# Patient Record
Sex: FEMALE | Race: WHITE | Employment: UNEMPLOYED | URBAN - METROPOLITAN AREA
[De-identification: names, ages, dates, MRNs, and addresses within clinical notes are randomized per-mention and may not be internally consistent; named-entity substitution may affect disease eponyms.]

---

## 2017-01-18 ENCOUNTER — HOSPITAL ENCOUNTER (OUTPATIENT)
Dept: ULTRASOUND IMAGING | Age: 42
Discharge: HOME | End: 2017-01-18
Attending: FAMILY MEDICINE
Payer: COMMERCIAL

## 2017-01-18 ENCOUNTER — PATIENT MESSAGE (OUTPATIENT)
Dept: FAMILY MEDICINE CLINIC | Facility: CLINIC | Age: 42
End: 2017-01-18

## 2017-01-18 ENCOUNTER — HOSPITAL ENCOUNTER (OUTPATIENT)
Dept: MAMMOGRAPHY | Age: 42
Discharge: HOME | End: 2017-01-18
Attending: FAMILY MEDICINE
Payer: COMMERCIAL

## 2017-01-18 DIAGNOSIS — E04.9 GOITER: ICD-10-CM

## 2017-01-18 DIAGNOSIS — Z12.31 ENCOUNTER FOR SCREENING MAMMOGRAM FOR HIGH-RISK PATIENT: ICD-10-CM

## 2017-01-18 PROCEDURE — 77067 SCR MAMMO BI INCL CAD: CPT

## 2017-01-18 PROCEDURE — 76536 US EXAM OF HEAD AND NECK: CPT

## 2017-01-18 PROCEDURE — 77063 BREAST TOMOSYNTHESIS BI: CPT

## 2017-01-19 NOTE — TELEPHONE ENCOUNTER
From: Rhona Calico Rentmeester  To: Dean Sloan DO  Sent: 1/18/2017 10:10 PM CST  Subject: Test Results Question    I received my recent test results for my ultrasound on my thyroid, I'm confused to why I need to come back in 1-2 months, oanh Sandoval

## 2018-02-12 ENCOUNTER — TELEPHONE (OUTPATIENT)
Dept: FAMILY MEDICINE CLINIC | Facility: CLINIC | Age: 43
End: 2018-02-12

## 2018-02-12 RX ORDER — SULFAMETHOXAZOLE AND TRIMETHOPRIM 800; 160 MG/1; MG/1
1 TABLET ORAL 2 TIMES DAILY
Qty: 14 TABLET | Refills: 0 | Status: SHIPPED | OUTPATIENT
Start: 2018-02-12 | End: 2018-03-14

## 2018-02-12 NOTE — TELEPHONE ENCOUNTER
Per Dr. Rebeca Cheadle, pt called last night with UTI symptoms.   Per Dr. Hernandes Reasons Bactrim DS 1 BID x 7 days

## 2018-03-14 ENCOUNTER — OFFICE VISIT (OUTPATIENT)
Dept: FAMILY MEDICINE CLINIC | Facility: CLINIC | Age: 43
End: 2018-03-14

## 2018-03-14 VITALS
RESPIRATION RATE: 18 BRPM | HEART RATE: 66 BPM | DIASTOLIC BLOOD PRESSURE: 76 MMHG | BODY MASS INDEX: 24.63 KG/M2 | OXYGEN SATURATION: 98 % | SYSTOLIC BLOOD PRESSURE: 114 MMHG | TEMPERATURE: 98 F | WEIGHT: 139 LBS | HEIGHT: 63 IN

## 2018-03-14 DIAGNOSIS — Z00.00 ANNUAL PHYSICAL EXAM: ICD-10-CM

## 2018-03-14 DIAGNOSIS — E04.9 GOITER: ICD-10-CM

## 2018-03-14 DIAGNOSIS — Z01.419 WELL WOMAN EXAM WITH ROUTINE GYNECOLOGICAL EXAM: Primary | ICD-10-CM

## 2018-03-14 DIAGNOSIS — Z12.31 ENCOUNTER FOR SCREENING MAMMOGRAM FOR HIGH-RISK PATIENT: ICD-10-CM

## 2018-03-14 PROCEDURE — 88175 CYTOPATH C/V AUTO FLUID REDO: CPT | Performed by: FAMILY MEDICINE

## 2018-03-14 PROCEDURE — 87624 HPV HI-RISK TYP POOLED RSLT: CPT | Performed by: FAMILY MEDICINE

## 2018-03-14 PROCEDURE — 99396 PREV VISIT EST AGE 40-64: CPT | Performed by: FAMILY MEDICINE

## 2018-03-14 RX ORDER — MULTIVIT-MIN/IRON FUM/FOLIC AC 7.5 MG-4
1 TABLET ORAL DAILY
COMMUNITY

## 2018-03-14 RX ORDER — SULFAMETHOXAZOLE AND TRIMETHOPRIM 800; 160 MG/1; MG/1
1 TABLET ORAL 2 TIMES DAILY
Qty: 14 TABLET | Refills: 0 | Status: SHIPPED | OUTPATIENT
Start: 2018-03-14 | End: 2019-06-20

## 2018-03-14 NOTE — PROGRESS NOTES
HPI:   Catherine Dhillon is a 36year old female who presents for a complete physical exam.       Wt Readings from Last 6 Encounters:  03/14/18 : 139 lb  09/13/16 : 126 lb  08/17/15 : 124 lb  08/15/14 : 117 lb  02/26/13 : 110 lb  12/27/12 : 108 lb unusual skin lesions  EYES:denies blurred vision or double vision  HEENT: denies nasal congestion, sinus pain or ST  LUNGS: denies shortness of breath with exertion  CARDIOVASCULAR: denies chest pain on exertion  GI: denies abdominal pain,denies heartburn Future  - COMP METABOLIC PANEL (14); Future  - VITAMIN D, 25-HYDROXY; Future  - THYROID PEROXIDASE AND THYROGLOBULIN ANTIBODIES; Future    3. Goiter    - US THYROID (DZF=12752); Future    4.  Encounter for screening mammogram for high-risk patient    - MONTSE

## 2018-03-15 LAB — HPV I/H RISK 1 DNA SPEC QL NAA+PROBE: NEGATIVE

## 2018-03-19 ENCOUNTER — HOSPITAL ENCOUNTER (OUTPATIENT)
Dept: ULTRASOUND IMAGING | Age: 43
Discharge: HOME | End: 2018-03-19
Attending: FAMILY MEDICINE
Payer: COMMERCIAL

## 2018-03-19 ENCOUNTER — LABORATORY ENCOUNTER (OUTPATIENT)
Dept: LAB | Age: 43
End: 2018-03-19
Attending: FAMILY MEDICINE
Payer: COMMERCIAL

## 2018-03-19 ENCOUNTER — HOSPITAL ENCOUNTER (OUTPATIENT)
Dept: MAMMOGRAPHY | Age: 43
Discharge: HOME | End: 2018-03-19
Attending: FAMILY MEDICINE
Payer: COMMERCIAL

## 2018-03-19 DIAGNOSIS — E04.9 GOITER: ICD-10-CM

## 2018-03-19 DIAGNOSIS — Z00.00 ANNUAL PHYSICAL EXAM: ICD-10-CM

## 2018-03-19 DIAGNOSIS — Z12.31 ENCOUNTER FOR SCREENING MAMMOGRAM FOR HIGH-RISK PATIENT: ICD-10-CM

## 2018-03-19 LAB
25-HYDROXYVITAMIN D (TOTAL): 67 NG/ML (ref 30–100)
ALBUMIN SERPL-MCNC: 3.8 G/DL (ref 3.5–4.8)
ALP LIVER SERPL-CCNC: 45 U/L (ref 37–98)
ALT SERPL-CCNC: 16 U/L (ref 14–54)
ANTI-THYROGLOBULIN: <15 U/ML (ref ?–60)
ANTI-THYROPEROXIDASE: <28 U/ML (ref ?–60)
AST SERPL-CCNC: 11 U/L (ref 15–41)
BASOPHILS # BLD AUTO: 0.05 X10(3) UL (ref 0–0.1)
BASOPHILS NFR BLD AUTO: 1.2 %
BILIRUB SERPL-MCNC: 0.5 MG/DL (ref 0.1–2)
BUN BLD-MCNC: 14 MG/DL (ref 8–20)
CALCIUM BLD-MCNC: 8.4 MG/DL (ref 8.3–10.3)
CHLORIDE: 106 MMOL/L (ref 101–111)
CHOLEST SMN-MCNC: 173 MG/DL (ref ?–200)
CO2: 26 MMOL/L (ref 22–32)
CREAT BLD-MCNC: 0.84 MG/DL (ref 0.55–1.02)
EOSINOPHIL # BLD AUTO: 0 X10(3) UL (ref 0–0.3)
EOSINOPHIL NFR BLD AUTO: 0 %
ERYTHROCYTE [DISTWIDTH] IN BLOOD BY AUTOMATED COUNT: 12.5 % (ref 11.5–16)
FREE T4: 0.6 NG/DL (ref 0.9–1.8)
GLUCOSE BLD-MCNC: 88 MG/DL (ref 70–99)
HAV AB SERPL IA-ACNC: 516 PG/ML (ref 193–986)
HCT VFR BLD AUTO: 37.4 % (ref 34–50)
HDLC SERPL-MCNC: 101 MG/DL (ref 45–?)
HDLC SERPL: 1.71 {RATIO} (ref ?–4.44)
HGB BLD-MCNC: 12.3 G/DL (ref 12–16)
IMMATURE GRANULOCYTE COUNT: 0.01 X10(3) UL (ref 0–1)
IMMATURE GRANULOCYTE RATIO %: 0.2 %
LDLC SERPL CALC-MCNC: 57 MG/DL (ref ?–130)
LYMPHOCYTES # BLD AUTO: 1.79 X10(3) UL (ref 0.9–4)
LYMPHOCYTES NFR BLD AUTO: 44.2 %
M PROTEIN MFR SERPL ELPH: 6.7 G/DL (ref 6.1–8.3)
MCH RBC QN AUTO: 31.9 PG (ref 27–33.2)
MCHC RBC AUTO-ENTMCNC: 32.9 G/DL (ref 31–37)
MCV RBC AUTO: 96.9 FL (ref 81–100)
MONOCYTES # BLD AUTO: 0.34 X10(3) UL (ref 0.1–1)
MONOCYTES NFR BLD AUTO: 8.4 %
NEUTROPHIL ABS PRELIM: 1.86 X10 (3) UL (ref 1.3–6.7)
NEUTROPHILS # BLD AUTO: 1.86 X10(3) UL (ref 1.3–6.7)
NEUTROPHILS NFR BLD AUTO: 46 %
NONHDLC SERPL-MCNC: 72 MG/DL (ref ?–130)
PLATELET # BLD AUTO: 199 10(3)UL (ref 150–450)
POTASSIUM SERPL-SCNC: 3.8 MMOL/L (ref 3.6–5.1)
RBC # BLD AUTO: 3.86 X10(6)UL (ref 3.8–5.1)
RED CELL DISTRIBUTION WIDTH-SD: 44.1 FL (ref 35.1–46.3)
SODIUM SERPL-SCNC: 139 MMOL/L (ref 136–144)
T3FREE SERPL-MCNC: 2.2 PG/ML (ref 2.3–4.2)
TRIGL SERPL-MCNC: 77 MG/DL (ref ?–150)
TSI SER-ACNC: 3.94 MIU/ML (ref 0.35–5.5)
VLDLC SERPL CALC-MCNC: 15 MG/DL (ref 5–40)
WBC # BLD AUTO: 4.1 X10(3) UL (ref 4–13)

## 2018-03-19 PROCEDURE — 82607 VITAMIN B-12: CPT

## 2018-03-19 PROCEDURE — 36415 COLL VENOUS BLD VENIPUNCTURE: CPT

## 2018-03-19 PROCEDURE — 86376 MICROSOMAL ANTIBODY EACH: CPT

## 2018-03-19 PROCEDURE — 76536 US EXAM OF HEAD AND NECK: CPT | Performed by: FAMILY MEDICINE

## 2018-03-19 PROCEDURE — 80053 COMPREHEN METABOLIC PANEL: CPT

## 2018-03-19 PROCEDURE — 77067 SCR MAMMO BI INCL CAD: CPT | Performed by: FAMILY MEDICINE

## 2018-03-19 PROCEDURE — 84443 ASSAY THYROID STIM HORMONE: CPT

## 2018-03-19 PROCEDURE — 80050 GENERAL HEALTH PANEL: CPT

## 2018-03-19 PROCEDURE — 84439 ASSAY OF FREE THYROXINE: CPT

## 2018-03-19 PROCEDURE — 86800 THYROGLOBULIN ANTIBODY: CPT

## 2018-03-19 PROCEDURE — 84481 FREE ASSAY (FT-3): CPT

## 2018-03-19 PROCEDURE — 77063 BREAST TOMOSYNTHESIS BI: CPT | Performed by: FAMILY MEDICINE

## 2018-03-19 PROCEDURE — 80061 LIPID PANEL: CPT

## 2018-03-19 PROCEDURE — 82306 VITAMIN D 25 HYDROXY: CPT

## 2018-08-03 ENCOUNTER — TELEPHONE (OUTPATIENT)
Dept: FAMILY MEDICINE CLINIC | Facility: CLINIC | Age: 43
End: 2018-08-03

## 2018-08-03 DIAGNOSIS — E04.9 GOITER: Primary | ICD-10-CM

## 2018-08-03 NOTE — TELEPHONE ENCOUNTER
Needs thyroid labs put in. She is to redo them in 3-6 months from last draw. Please call her when they are put in.

## 2018-08-06 ENCOUNTER — APPOINTMENT (OUTPATIENT)
Dept: LAB | Age: 43
End: 2018-08-06
Attending: FAMILY MEDICINE
Payer: COMMERCIAL

## 2018-08-06 DIAGNOSIS — E04.9 GOITER: ICD-10-CM

## 2018-08-06 LAB
T3FREE SERPL-MCNC: 2.32 PG/ML (ref 2.3–4.2)
T4 FREE SERPL-MCNC: 0.8 NG/DL (ref 0.9–1.8)
THYROPEROXIDASE AB SERPL-ACNC: <28 U/ML (ref ?–60)
TSI SER-ACNC: 2.28 MIU/ML (ref 0.35–5.5)

## 2018-08-06 PROCEDURE — 84481 FREE ASSAY (FT-3): CPT

## 2018-08-06 PROCEDURE — 84443 ASSAY THYROID STIM HORMONE: CPT

## 2018-08-06 PROCEDURE — 36415 COLL VENOUS BLD VENIPUNCTURE: CPT

## 2018-08-06 PROCEDURE — 84439 ASSAY OF FREE THYROXINE: CPT

## 2018-08-06 PROCEDURE — 86376 MICROSOMAL ANTIBODY EACH: CPT

## 2018-08-29 ENCOUNTER — LAB ENCOUNTER (OUTPATIENT)
Dept: LAB | Age: 43
End: 2018-08-29
Attending: FAMILY MEDICINE
Payer: COMMERCIAL

## 2018-08-29 ENCOUNTER — OFFICE VISIT (OUTPATIENT)
Dept: FAMILY MEDICINE CLINIC | Facility: CLINIC | Age: 43
End: 2018-08-29
Payer: COMMERCIAL

## 2018-08-29 VITALS
BODY MASS INDEX: 24.67 KG/M2 | SYSTOLIC BLOOD PRESSURE: 98 MMHG | WEIGHT: 141 LBS | HEART RATE: 60 BPM | OXYGEN SATURATION: 99 % | DIASTOLIC BLOOD PRESSURE: 62 MMHG | RESPIRATION RATE: 16 BRPM | TEMPERATURE: 98 F | HEIGHT: 63.5 IN

## 2018-08-29 DIAGNOSIS — R00.2 PALPITATIONS: ICD-10-CM

## 2018-08-29 DIAGNOSIS — N92.0 MENORRHAGIA WITH REGULAR CYCLE: ICD-10-CM

## 2018-08-29 DIAGNOSIS — G47.9 SLEEP DISTURBANCE: Primary | ICD-10-CM

## 2018-08-29 DIAGNOSIS — G47.09 OTHER INSOMNIA: ICD-10-CM

## 2018-08-29 LAB
BASOPHILS # BLD AUTO: 0.05 X10(3) UL (ref 0–0.1)
BASOPHILS NFR BLD AUTO: 0.9 %
DEPRECATED HBV CORE AB SER IA-ACNC: 63.9 NG/ML (ref 12–240)
EOSINOPHIL # BLD AUTO: 0 X10(3) UL (ref 0–0.3)
EOSINOPHIL NFR BLD AUTO: 0 %
ERYTHROCYTE [DISTWIDTH] IN BLOOD BY AUTOMATED COUNT: 12.1 % (ref 11.5–16)
HCT VFR BLD AUTO: 35.4 % (ref 34–50)
HGB BLD-MCNC: 11.8 G/DL (ref 12–16)
IMMATURE GRANULOCYTE COUNT: 0.01 X10(3) UL (ref 0–1)
IMMATURE GRANULOCYTE RATIO %: 0.2 %
IRON SATURATION: 25 % (ref 15–50)
IRON: 90 UG/DL (ref 28–170)
LYMPHOCYTES # BLD AUTO: 1.54 X10(3) UL (ref 0.9–4)
LYMPHOCYTES NFR BLD AUTO: 27.2 %
MCH RBC QN AUTO: 32.8 PG (ref 27–33.2)
MCHC RBC AUTO-ENTMCNC: 33.3 G/DL (ref 31–37)
MCV RBC AUTO: 98.3 FL (ref 81–100)
MONOCYTES # BLD AUTO: 0.35 X10(3) UL (ref 0.1–1)
MONOCYTES NFR BLD AUTO: 6.2 %
NEUTROPHIL ABS PRELIM: 3.72 X10 (3) UL (ref 1.3–6.7)
NEUTROPHILS # BLD AUTO: 3.72 X10(3) UL (ref 1.3–6.7)
NEUTROPHILS NFR BLD AUTO: 65.5 %
PLATELET # BLD AUTO: 217 10(3)UL (ref 150–450)
RBC # BLD AUTO: 3.6 X10(6)UL (ref 3.8–5.1)
RED CELL DISTRIBUTION WIDTH-SD: 44 FL (ref 35.1–46.3)
TOTAL IRON BINDING CAPACITY: 355 UG/DL (ref 240–450)
TRANSFERRIN SERPL-MCNC: 238 MG/DL (ref 200–360)
WBC # BLD AUTO: 5.7 X10(3) UL (ref 4–13)

## 2018-08-29 PROCEDURE — 99214 OFFICE O/P EST MOD 30 MIN: CPT | Performed by: FAMILY MEDICINE

## 2018-08-29 PROCEDURE — 82728 ASSAY OF FERRITIN: CPT

## 2018-08-29 PROCEDURE — 85025 COMPLETE CBC W/AUTO DIFF WBC: CPT

## 2018-08-29 PROCEDURE — 83550 IRON BINDING TEST: CPT

## 2018-08-29 PROCEDURE — 83540 ASSAY OF IRON: CPT

## 2018-08-29 PROCEDURE — 36415 COLL VENOUS BLD VENIPUNCTURE: CPT

## 2018-08-29 NOTE — PROGRESS NOTES
HPI:   Alanna Payne is a 36year old female who presents for sleep concerns    Pt reports she has been having periodic problems  Pt struggle to fall asleep and then will often wake up feeling palpitations  Pt will have heart racing   Pt feels congestion, sinus pain or ST  LUNGS: denies shortness of breath with exertion  CARDIOVASCULAR: denies chest pain on exertion  GI: denies abdominal pain,denies heartburn  : denies dysuria, vaginal discharge or itching,periods regular   MUSCULOSKELETAL: de

## 2018-08-29 NOTE — PROGRESS NOTES
HPI:   Phillip Patel is a 36year old female who presents for sleep concerns        Current Outpatient Prescriptions:  Multiple Vitamins-Minerals (MULTI-VITAMIN/MINERALS) Oral Tab Take 1 tablet by mouth daily.  Disp:  Rfl:    Omega-3 Fatty Acids murmur  LUNGS: clear to auscultation  NECK: supple,no adenopathy, + thyromegaly  HEENT: atraumatic, normocephalic,ears and throat are clear  EYES:PERRLA, EOMI, normal,conjunctiva are clear  SKIN: norashes,no suspicious lesions  GI: good BS's,no masses, HSM

## 2018-08-30 ENCOUNTER — PATIENT MESSAGE (OUTPATIENT)
Dept: FAMILY MEDICINE CLINIC | Facility: CLINIC | Age: 43
End: 2018-08-30

## 2018-08-31 NOTE — TELEPHONE ENCOUNTER
From: Palmira Hugh Rentmeester  To: Jada Gilbert DO  Sent: 8/30/2018 4:20 PM CDT  Subject: Test Results Question    My rbc and hgb are low, anything I need to do or be concerned about?

## 2018-09-11 ENCOUNTER — OFFICE VISIT (OUTPATIENT)
Dept: SLEEP CENTER | Facility: HOSPITAL | Age: 43
End: 2018-09-11
Attending: INTERNAL MEDICINE
Payer: COMMERCIAL

## 2018-09-11 DIAGNOSIS — G47.9 SLEEP DISTURBANCE: ICD-10-CM

## 2018-09-11 DIAGNOSIS — G47.09 OTHER INSOMNIA: ICD-10-CM

## 2018-09-11 PROCEDURE — 95806 SLEEP STUDY UNATT&RESP EFFT: CPT

## 2018-09-13 NOTE — PROCEDURES
[address]       Accredited by the [facility] of Sleep Medicine ([facility])    PATIENT'S NAME:        Leonel Guerrero  ATTENDING PHYSICIAN:   Chris Jacome M.D.   REFERRING PHYSICIAN:   JACOB Smiley resistance. RECOMMENDATIONS:    1. In the absence of sleep-disordered breathing, further clinical correlation is necessary to determine etiology of the patient's ongoing symptoms. 2.   The patient should avoid drowsy driving.   3.   The patient may fo

## 2018-11-13 ENCOUNTER — OFFICE VISIT (OUTPATIENT)
Dept: FAMILY MEDICINE CLINIC | Facility: CLINIC | Age: 43
End: 2018-11-13
Payer: COMMERCIAL

## 2018-11-13 VITALS
HEIGHT: 63.5 IN | RESPIRATION RATE: 20 BRPM | TEMPERATURE: 98 F | OXYGEN SATURATION: 99 % | BODY MASS INDEX: 24.26 KG/M2 | HEART RATE: 62 BPM | WEIGHT: 138.63 LBS | DIASTOLIC BLOOD PRESSURE: 64 MMHG | SYSTOLIC BLOOD PRESSURE: 100 MMHG

## 2018-11-13 DIAGNOSIS — S29.011A PECTORALIS MUSCLE STRAIN, INITIAL ENCOUNTER: ICD-10-CM

## 2018-11-13 DIAGNOSIS — Z80.0 FAMILY HISTORY OF COLON CANCER: Primary | ICD-10-CM

## 2018-11-13 PROCEDURE — 99213 OFFICE O/P EST LOW 20 MIN: CPT | Performed by: FAMILY MEDICINE

## 2018-11-13 NOTE — PROGRESS NOTES
HPI:   Raj Galicia is a 36year old female who presents for left breast pain     It started at the end of October     + family history of  ovarian cancer- maternal aunt   Maternal aunt and paternal aunt and cousin in her 29's - breast cancer   Mate or anxiety  HEMATOLOGIC: denies hx of anemia  ALL/ASTHMA: denies asthma    EXAM:   /64   Pulse 62   Temp 98.2 °F (36.8 °C) (Oral)   Resp 20   Ht 63.5\"   Wt 138 lb 9.6 oz   LMP 10/22/2018   SpO2 99%   BMI 24.17 kg/m²   Body mass index is 24.17 kg/m².

## 2019-01-14 ENCOUNTER — OFFICE VISIT (OUTPATIENT)
Dept: SURGERY | Facility: CLINIC | Age: 44
End: 2019-01-14
Payer: COMMERCIAL

## 2019-01-14 VITALS
WEIGHT: 130 LBS | SYSTOLIC BLOOD PRESSURE: 105 MMHG | BODY MASS INDEX: 22.75 KG/M2 | HEART RATE: 61 BPM | DIASTOLIC BLOOD PRESSURE: 71 MMHG | HEIGHT: 63.5 IN | TEMPERATURE: 98 F

## 2019-01-14 DIAGNOSIS — R19.8 ALTERNATING CONSTIPATION AND DIARRHEA: Primary | ICD-10-CM

## 2019-01-14 DIAGNOSIS — Z80.0 FAMILY HISTORY OF COLON CANCER: ICD-10-CM

## 2019-01-14 DIAGNOSIS — Z83.71 FAMILY HISTORY OF COLONIC POLYPS: ICD-10-CM

## 2019-01-14 DIAGNOSIS — E28.2 POLYCYSTIC OVARIES: ICD-10-CM

## 2019-01-14 PROCEDURE — 99243 OFF/OP CNSLTJ NEW/EST LOW 30: CPT | Performed by: COLON & RECTAL SURGERY

## 2019-01-14 RX ORDER — MELATONIN 10 MG
CAPSULE ORAL AS NEEDED
COMMUNITY

## 2019-01-14 RX ORDER — POLYETHYLENE GLYCOL 3350, SODIUM CHLORIDE, SODIUM BICARBONATE, POTASSIUM CHLORIDE 420; 11.2; 5.72; 1.48 G/4L; G/4L; G/4L; G/4L
POWDER, FOR SOLUTION ORAL
Qty: 1 BOTTLE | Refills: 0 | Status: SHIPPED | OUTPATIENT
Start: 2019-01-14 | End: 2019-06-20 | Stop reason: ALTCHOICE

## 2019-01-14 NOTE — H&P
New Patient Visit Note       Active Problems      1. Alternating constipation and diarrhea    2. Polycystic ovaries    3. Family history of colon cancer, paternal aunt age 62    1.  Family history of colonic polyps, father         Chief Tommy Waddell had a stroke or TIA. The patient does not have a heart murmur or valve. The patient does not require special antibiotics prior to surgery because of a prosthesis or any other reason.      The patient is not on medications that might interfere with IV se No    Other Topics      Concerns:        Caffeine Concern: No        Exercise: Yes       Current Outpatient Medications:  Melatonin 10 MG Oral Cap Take by mouth as needed.  Disp:  Rfl:    PEG 3350-KCl-Na Bicarb-NaCl (TRILYTE) 420 g Oral Recon Soln Starting distress. HENT:   Head: Normocephalic and atraumatic. Eyes: EOM are normal.   Neck: Normal range of motion. Pulmonary/Chest: Effort normal. No respiratory distress. Abdominal: Normal appearance.  She exhibits no shifting dullness, no distension, no was fully discussed. The patient seemed to understand the conversation and its details. Consent for surgery was confirmed with the patient.     In light of her strong family history of breast cancer, I recommend she receive genetic testing and also qu

## 2019-01-14 NOTE — PATIENT INSTRUCTIONS
Emanuel Woodard is a 39year old female her for a colonoscopy. The patient has not had a previous colonoscopy. The patient does not have a history of black/tarry stools. The patient does not have a history of bright red blood per rectum.  The pa negaitve, the patient is not exhibiting rigidity, guarding or rebound. There are no ascites. No palpable ventral or inguinal hernias bilaterally. Liver is within normal limits and spleen is not palpable.     The patient has alternating diarrhea and constipa

## 2019-01-18 PROBLEM — R19.8 ALTERNATING CONSTIPATION AND DIARRHEA: Status: ACTIVE | Noted: 2019-01-18

## 2019-01-18 PROBLEM — Z83.719 FAMILY HISTORY OF COLONIC POLYPS: Status: RESOLVED | Noted: 2019-01-18 | Resolved: 2019-01-18

## 2019-01-18 PROBLEM — Z83.71 FAMILY HISTORY OF COLONIC POLYPS: Status: RESOLVED | Noted: 2019-01-18 | Resolved: 2019-01-18

## 2019-01-18 PROBLEM — Z83.719 FAMILY HISTORY OF COLONIC POLYPS: Status: ACTIVE | Noted: 2019-01-18

## 2019-01-18 PROBLEM — Z83.71 FAMILY HISTORY OF COLONIC POLYPS: Status: ACTIVE | Noted: 2019-01-18

## 2019-01-18 PROBLEM — Z80.0 FAMILY HISTORY OF COLON CANCER: Status: ACTIVE | Noted: 2019-01-18

## 2019-01-23 ENCOUNTER — TELEPHONE (OUTPATIENT)
Dept: SURGERY | Facility: CLINIC | Age: 44
End: 2019-01-23

## 2019-01-23 DIAGNOSIS — R19.4 CHANGE IN BOWEL HABITS: Primary | ICD-10-CM

## 2019-01-23 NOTE — TELEPHONE ENCOUNTER
Phoned pt, left  for pt to call our office to reschedule her surgery with Dr. Kiana Jean-Baptiste for 2/27/19

## 2019-01-24 ENCOUNTER — PATIENT MESSAGE (OUTPATIENT)
Dept: FAMILY MEDICINE CLINIC | Facility: CLINIC | Age: 44
End: 2019-01-24

## 2019-01-24 DIAGNOSIS — Z80.0 FAMILY HISTORY OF MALIGNANT NEOPLASM OF GASTROINTESTINAL TRACT: Primary | ICD-10-CM

## 2019-01-24 NOTE — TELEPHONE ENCOUNTER
From: Natalya Bazan  To: Milagros De Dios DO  Sent: 1/24/2019 12:31 PM CST  Subject: Referral Request    Hi Dr. Edwardo Patel,    I scheduled my colonoscopy with Dr. Renford Barthel, they  called me and said he had to cancel until further notice?  Anyway,  Who else

## 2019-05-03 ENCOUNTER — LAB ENCOUNTER (OUTPATIENT)
Dept: LAB | Age: 44
End: 2019-05-03
Attending: FAMILY MEDICINE
Payer: COMMERCIAL

## 2019-05-03 ENCOUNTER — OFFICE VISIT (OUTPATIENT)
Dept: FAMILY MEDICINE CLINIC | Facility: CLINIC | Age: 44
End: 2019-05-03
Payer: COMMERCIAL

## 2019-05-03 VITALS
SYSTOLIC BLOOD PRESSURE: 108 MMHG | HEIGHT: 63.5 IN | DIASTOLIC BLOOD PRESSURE: 68 MMHG | BODY MASS INDEX: 25.37 KG/M2 | TEMPERATURE: 98 F | RESPIRATION RATE: 16 BRPM | OXYGEN SATURATION: 98 % | HEART RATE: 70 BPM | WEIGHT: 145 LBS

## 2019-05-03 DIAGNOSIS — Z00.00 ANNUAL PHYSICAL EXAM: ICD-10-CM

## 2019-05-03 DIAGNOSIS — Z01.419 WELL WOMAN EXAM WITH ROUTINE GYNECOLOGICAL EXAM: Primary | ICD-10-CM

## 2019-05-03 DIAGNOSIS — E04.9 GOITER: ICD-10-CM

## 2019-05-03 DIAGNOSIS — Z12.31 ENCOUNTER FOR SCREENING MAMMOGRAM FOR HIGH-RISK PATIENT: ICD-10-CM

## 2019-05-03 PROCEDURE — 82728 ASSAY OF FERRITIN: CPT

## 2019-05-03 PROCEDURE — 80061 LIPID PANEL: CPT

## 2019-05-03 PROCEDURE — 84443 ASSAY THYROID STIM HORMONE: CPT

## 2019-05-03 PROCEDURE — 36415 COLL VENOUS BLD VENIPUNCTURE: CPT

## 2019-05-03 PROCEDURE — 85025 COMPLETE CBC W/AUTO DIFF WBC: CPT

## 2019-05-03 PROCEDURE — 86376 MICROSOMAL ANTIBODY EACH: CPT

## 2019-05-03 PROCEDURE — 88175 CYTOPATH C/V AUTO FLUID REDO: CPT | Performed by: FAMILY MEDICINE

## 2019-05-03 PROCEDURE — 99396 PREV VISIT EST AGE 40-64: CPT | Performed by: FAMILY MEDICINE

## 2019-05-03 PROCEDURE — 82306 VITAMIN D 25 HYDROXY: CPT

## 2019-05-03 PROCEDURE — 87624 HPV HI-RISK TYP POOLED RSLT: CPT | Performed by: FAMILY MEDICINE

## 2019-05-03 PROCEDURE — 83540 ASSAY OF IRON: CPT

## 2019-05-03 PROCEDURE — 86800 THYROGLOBULIN ANTIBODY: CPT

## 2019-05-03 PROCEDURE — 84439 ASSAY OF FREE THYROXINE: CPT

## 2019-05-03 PROCEDURE — 80053 COMPREHEN METABOLIC PANEL: CPT

## 2019-05-03 PROCEDURE — 82607 VITAMIN B-12: CPT

## 2019-05-03 PROCEDURE — 83550 IRON BINDING TEST: CPT

## 2019-05-03 NOTE — PROGRESS NOTES
HPI:   Raj Galicia is a 39year old female who presents for a complete physical exam.       Wt Readings from Last 6 Encounters:  05/03/19 : 145 lb  01/14/19 : 130 lb  11/13/18 : 138 lb 9.6 oz  08/29/18 : 141 lb  03/14/18 : 139 lb  09/13/16 : 126 lb Social History:   Social History    Tobacco Use      Smoking status: Never Smoker      Smokeless tobacco: Never Used    Alcohol use: No    Drug use: No    Occ: . : 2. Children: . Homemaker   Exercise: three times per week.   Diet: nuvoTV intact    ASSESSMENT AND PLAN:   Freddy Mon is a 39year old female who presents for     1. Well woman exam with routine gynecological exam      2. Annual physical exam    - FREE T4 (FREE THYROXINE);  Future  - ASSAY, THYROID STIM HORMONE; Future

## 2019-05-13 ENCOUNTER — HOSPITAL ENCOUNTER (OUTPATIENT)
Dept: MAMMOGRAPHY | Age: 44
Discharge: HOME | End: 2019-05-13
Attending: FAMILY MEDICINE
Payer: COMMERCIAL

## 2019-05-13 ENCOUNTER — HOSPITAL ENCOUNTER (OUTPATIENT)
Dept: ULTRASOUND IMAGING | Age: 44
Discharge: HOME | End: 2019-05-13
Attending: FAMILY MEDICINE
Payer: COMMERCIAL

## 2019-05-13 DIAGNOSIS — E04.9 GOITER: ICD-10-CM

## 2019-05-13 DIAGNOSIS — Z12.31 ENCOUNTER FOR SCREENING MAMMOGRAM FOR HIGH-RISK PATIENT: ICD-10-CM

## 2019-05-13 PROCEDURE — 76536 US EXAM OF HEAD AND NECK: CPT | Performed by: FAMILY MEDICINE

## 2019-05-13 PROCEDURE — 77063 BREAST TOMOSYNTHESIS BI: CPT | Performed by: FAMILY MEDICINE

## 2019-05-13 PROCEDURE — 77067 SCR MAMMO BI INCL CAD: CPT | Performed by: FAMILY MEDICINE

## 2019-05-21 PROCEDURE — 88305 TISSUE EXAM BY PATHOLOGIST: CPT | Performed by: COLON & RECTAL SURGERY

## 2019-05-22 ENCOUNTER — LAB REQUISITION (OUTPATIENT)
Dept: LAB | Facility: HOSPITAL | Age: 44
End: 2019-05-22
Payer: COMMERCIAL

## 2019-05-22 DIAGNOSIS — Z12.11 ENCOUNTER FOR SCREENING FOR MALIGNANT NEOPLASM OF COLON: ICD-10-CM

## 2019-05-28 ENCOUNTER — TELEPHONE (OUTPATIENT)
Dept: SURGERY | Facility: CLINIC | Age: 44
End: 2019-05-28

## 2019-05-31 ENCOUNTER — PATIENT OUTREACH (OUTPATIENT)
Dept: SURGERY | Facility: CLINIC | Age: 44
End: 2019-05-31

## 2019-06-20 ENCOUNTER — TELEPHONE (OUTPATIENT)
Dept: FAMILY MEDICINE CLINIC | Facility: CLINIC | Age: 44
End: 2019-06-20

## 2019-06-20 ENCOUNTER — OFFICE VISIT (OUTPATIENT)
Dept: FAMILY MEDICINE CLINIC | Facility: CLINIC | Age: 44
End: 2019-06-20
Payer: COMMERCIAL

## 2019-06-20 VITALS
DIASTOLIC BLOOD PRESSURE: 72 MMHG | RESPIRATION RATE: 18 BRPM | WEIGHT: 145.81 LBS | HEART RATE: 60 BPM | HEIGHT: 63.5 IN | SYSTOLIC BLOOD PRESSURE: 110 MMHG | OXYGEN SATURATION: 99 % | BODY MASS INDEX: 25.52 KG/M2 | TEMPERATURE: 98 F

## 2019-06-20 DIAGNOSIS — R30.0 DYSURIA: Primary | ICD-10-CM

## 2019-06-20 PROCEDURE — 99213 OFFICE O/P EST LOW 20 MIN: CPT | Performed by: PHYSICIAN ASSISTANT

## 2019-06-20 PROCEDURE — 87086 URINE CULTURE/COLONY COUNT: CPT | Performed by: PHYSICIAN ASSISTANT

## 2019-06-20 PROCEDURE — 81003 URINALYSIS AUTO W/O SCOPE: CPT | Performed by: PHYSICIAN ASSISTANT

## 2019-06-20 RX ORDER — SULFAMETHOXAZOLE AND TRIMETHOPRIM 800; 160 MG/1; MG/1
1 TABLET ORAL 2 TIMES DAILY
Qty: 14 TABLET | Refills: 0 | Status: SHIPPED | OUTPATIENT
Start: 2019-06-20 | End: 2019-06-20 | Stop reason: ALTCHOICE

## 2019-06-20 RX ORDER — SULFAMETHOXAZOLE AND TRIMETHOPRIM 800; 160 MG/1; MG/1
1 TABLET ORAL 2 TIMES DAILY
Qty: 14 TABLET | Refills: 0 | Status: SHIPPED | OUTPATIENT
Start: 2019-06-20 | End: 2019-06-24

## 2019-06-20 NOTE — PROGRESS NOTES
HPI:    Patient ID: Pan Boy is a 39year old female. HPI   Patient with h/o recurrent UTIs presents today c/o dysuria and frequency since this morning. She has urgency as well. Hematuria present but attributes this to her menstrual cycle.  Sh tenderness in the suprapubic area. There is no rigidity, no rebound, no guarding and no CVA tenderness. No hernia. Neurological: She is alert. Psychiatric: She has a normal mood and affect. ASSESSMENT/PLAN:   1.  Dysuria  Patient with h/o r

## 2019-06-23 ENCOUNTER — PATIENT MESSAGE (OUTPATIENT)
Dept: FAMILY MEDICINE CLINIC | Facility: CLINIC | Age: 44
End: 2019-06-23

## 2019-06-24 ENCOUNTER — PATIENT MESSAGE (OUTPATIENT)
Dept: FAMILY MEDICINE CLINIC | Facility: CLINIC | Age: 44
End: 2019-06-24

## 2019-06-24 RX ORDER — SULFAMETHOXAZOLE AND TRIMETHOPRIM 800; 160 MG/1; MG/1
1 TABLET ORAL 2 TIMES DAILY
Qty: 14 TABLET | Refills: 0 | Status: SHIPPED | OUTPATIENT
Start: 2019-06-24 | End: 2020-07-20 | Stop reason: ALTCHOICE

## 2019-06-24 NOTE — TELEPHONE ENCOUNTER
From: Doc Cheema  To: Lolis Ackerman  Sent: 6/24/2019 8:03 AM CDT  Subject: Prescription Question    López Pagan,    Can you please call into [facility] another prescription that you did last week for me to have on hand for the year?  Dr. Rico Mejía

## 2019-06-24 NOTE — TELEPHONE ENCOUNTER
From: Christina Campos  To: Donna Floyd  Sent: 6/23/2019 10:19 AM CDT  Subject: Test Results Question    López Silvan,    Do my test results say that I have a uti infection? I’m still having symptoms 3 days into taking my antibiotic? Please advise.

## 2019-06-24 NOTE — TELEPHONE ENCOUNTER
Viewed by Vazquez Ponce on 6/24/2019  8:00 AM   Written by Meredith Ramsey PA-C on 6/24/2019  7:40 AM   López Short,     The urine culture is negative. Sejal Graft is no evidence of bacterial growth.  Please let me know if you continue to have symptoms.

## 2019-06-25 ENCOUNTER — OFFICE VISIT (OUTPATIENT)
Dept: FAMILY MEDICINE CLINIC | Facility: CLINIC | Age: 44
End: 2019-06-25
Payer: COMMERCIAL

## 2019-06-25 VITALS
DIASTOLIC BLOOD PRESSURE: 74 MMHG | HEIGHT: 63.52 IN | RESPIRATION RATE: 16 BRPM | BODY MASS INDEX: 24.67 KG/M2 | OXYGEN SATURATION: 98 % | HEART RATE: 71 BPM | WEIGHT: 141 LBS | SYSTOLIC BLOOD PRESSURE: 104 MMHG | TEMPERATURE: 98 F

## 2019-06-25 DIAGNOSIS — R30.0 DYSURIA: Primary | ICD-10-CM

## 2019-06-25 PROCEDURE — 99213 OFFICE O/P EST LOW 20 MIN: CPT | Performed by: FAMILY MEDICINE

## 2019-06-25 PROCEDURE — 87086 URINE CULTURE/COLONY COUNT: CPT | Performed by: FAMILY MEDICINE

## 2019-06-25 PROCEDURE — 81003 URINALYSIS AUTO W/O SCOPE: CPT | Performed by: FAMILY MEDICINE

## 2019-06-25 RX ORDER — CIPROFLOXACIN 500 MG/1
500 TABLET, FILM COATED ORAL 2 TIMES DAILY
Qty: 10 TABLET | Refills: 0 | Status: SHIPPED | OUTPATIENT
Start: 2019-06-25 | End: 2019-06-30

## 2019-06-25 NOTE — PROGRESS NOTES
HPI:   Georgie Nava is a 39year old female who presents for persistent UTI symptoms     Pt reports persistent symptoms despite bactrim and negative culture  + dysuria / + urinary urgency and frequency   No fever   Pelvic fullness  No fever        C heartburn  : denies dysuria, vaginal discharge or itching   MUSCULOSKELETAL: denies back pain  NEURO: denies headaches  PSYCHE: denies depression or anxiety  HEMATOLOGIC: denies hx of anemia  ALL/ASTHMA: denies asthma    EXAM:   /74   Pulse 71   Te

## 2020-07-20 ENCOUNTER — OFFICE VISIT (OUTPATIENT)
Dept: FAMILY MEDICINE CLINIC | Facility: CLINIC | Age: 45
End: 2020-07-20
Payer: COMMERCIAL

## 2020-07-20 VITALS
DIASTOLIC BLOOD PRESSURE: 68 MMHG | SYSTOLIC BLOOD PRESSURE: 110 MMHG | TEMPERATURE: 98 F | HEIGHT: 63.5 IN | RESPIRATION RATE: 18 BRPM | BODY MASS INDEX: 24.15 KG/M2 | WEIGHT: 138 LBS | OXYGEN SATURATION: 98 % | HEART RATE: 70 BPM

## 2020-07-20 DIAGNOSIS — E55.9 VITAMIN D DEFICIENCY: ICD-10-CM

## 2020-07-20 DIAGNOSIS — Z00.00 ANNUAL PHYSICAL EXAM: ICD-10-CM

## 2020-07-20 DIAGNOSIS — E04.9 GOITER: ICD-10-CM

## 2020-07-20 DIAGNOSIS — Z01.419 WELL WOMAN EXAM WITH ROUTINE GYNECOLOGICAL EXAM: Primary | ICD-10-CM

## 2020-07-20 DIAGNOSIS — Z12.31 ENCOUNTER FOR SCREENING MAMMOGRAM FOR HIGH-RISK PATIENT: ICD-10-CM

## 2020-07-20 DIAGNOSIS — Z20.822 EXPOSURE TO COVID-19 VIRUS: ICD-10-CM

## 2020-07-20 PROCEDURE — 99396 PREV VISIT EST AGE 40-64: CPT | Performed by: FAMILY MEDICINE

## 2020-07-20 PROCEDURE — 3008F BODY MASS INDEX DOCD: CPT | Performed by: FAMILY MEDICINE

## 2020-07-20 PROCEDURE — 3074F SYST BP LT 130 MM HG: CPT | Performed by: FAMILY MEDICINE

## 2020-07-20 PROCEDURE — 3078F DIAST BP <80 MM HG: CPT | Performed by: FAMILY MEDICINE

## 2020-07-20 NOTE — PROGRESS NOTES
HPI:   Patricia Salter is a 38year old female who presents for a complete physical exam.       Wt Readings from Last 6 Encounters:  07/20/20 : 138 lb (62.6 kg)  06/25/19 : 141 lb (64 kg)  06/20/19 : 145 lb 12.8 oz (66.1 kg)  05/03/19 : 145 lb (65.8 kg Drug use: No    Occ: . : 2. Children:   Homemaker   Exercise: three times per week.   Diet: watches calories closely     REVIEW OF SYSTEMS:   GENERAL: feels well otherwise  SKIN: denies any unusual skin lesions  EYES:denies blurred vision or double v gynecological exam      2.  Annual physical exam    - FREE T4 (FREE THYROXINE)  - ASSAY, THYROID STIM HORMONE  - LIPID PANEL  - CBC WITH DIFFERENTIAL WITH PLATELET  - VITAMIN J44  - VITAMIN D, 51-ZCWSVEE  - COMP METABOLIC PANEL (14)  - SARS COV 2 SEROLOGY (

## 2020-07-21 LAB — HPV MRNA E6/E7: NOT DETECTED

## 2020-07-29 LAB
ABSOLUTE BASOPHILS: 62 CELLS/UL (ref 0–200)
ABSOLUTE EOSINOPHILS: 79 CELLS/UL (ref 15–500)
ABSOLUTE LYMPHOCYTES: 1360 CELLS/UL (ref 850–3900)
ABSOLUTE MONOCYTES: 321 CELLS/UL (ref 200–950)
ABSOLUTE NEUTROPHILS: 2578 CELLS/UL (ref 1500–7800)
ALBUMIN/GLOBULIN RATIO: 1.8 (CALC) (ref 1–2.5)
ALBUMIN: 4.1 G/DL (ref 3.6–5.1)
ALKALINE PHOSPHATASE: 39 U/L (ref 31–125)
ALT: 9 U/L (ref 6–29)
AST: 15 U/L (ref 10–35)
BASOPHILS: 1.4 %
BILIRUBIN, TOTAL: 0.8 MG/DL (ref 0.2–1.2)
BUN: 12 MG/DL (ref 7–25)
CALCIUM: 9.2 MG/DL (ref 8.6–10.2)
CARBON DIOXIDE: 26 MMOL/L (ref 20–32)
CHLORIDE: 107 MMOL/L (ref 98–110)
CHOL/HDLC RATIO: 2.1 (CALC)
CHOLESTEROL, TOTAL: 167 MG/DL
CREATININE: 0.95 MG/DL (ref 0.5–1.1)
EGFR IF AFRICN AM: 84 ML/MIN/1.73M2
EGFR IF NONAFRICN AM: 72 ML/MIN/1.73M2
EOSINOPHILS: 1.8 %
GLOBULIN: 2.3 G/DL (CALC) (ref 1.9–3.7)
GLUCOSE: 91 MG/DL (ref 65–99)
HDL CHOLESTEROL: 80 MG/DL
HEMATOCRIT: 36.3 % (ref 35–45)
HEMOGLOBIN: 12.3 G/DL (ref 11.7–15.5)
LDL-CHOLESTEROL: 73 MG/DL (CALC)
LYMPHOCYTES: 30.9 %
MCH: 32.2 PG (ref 27–33)
MCHC: 33.9 G/DL (ref 32–36)
MCV: 95 FL (ref 80–100)
MONOCYTES: 7.3 %
MPV: 11.1 FL (ref 7.5–12.5)
NEUTROPHILS: 58.6 %
NON-HDL CHOLESTEROL: 87 MG/DL (CALC)
PLATELET COUNT: 213 THOUSAND/UL (ref 140–400)
POTASSIUM: 3.8 MMOL/L (ref 3.5–5.3)
PROTEIN, TOTAL: 6.4 G/DL (ref 6.1–8.1)
RDW: 11.9 % (ref 11–15)
RED BLOOD CELL COUNT: 3.82 MILLION/UL (ref 3.8–5.1)
SODIUM: 139 MMOL/L (ref 135–146)
T4, FREE: 1 NG/DL (ref 0.8–1.8)
TRIGLYCERIDES: 59 MG/DL
TSH: 3.01 MIU/L
VITAMIN B12: 544 PG/ML (ref 200–1100)
VITAMIN D, 25-OH, TOTAL: 59 NG/ML (ref 30–100)
WHITE BLOOD CELL COUNT: 4.4 THOUSAND/UL (ref 3.8–10.8)

## 2020-08-04 ENCOUNTER — PATIENT MESSAGE (OUTPATIENT)
Dept: FAMILY MEDICINE CLINIC | Facility: CLINIC | Age: 45
End: 2020-08-04

## 2020-08-04 ENCOUNTER — HOSPITAL ENCOUNTER (OUTPATIENT)
Dept: MAMMOGRAPHY | Age: 45
Discharge: HOME | End: 2020-08-04
Attending: FAMILY MEDICINE
Payer: COMMERCIAL

## 2020-08-04 ENCOUNTER — HOSPITAL ENCOUNTER (OUTPATIENT)
Dept: ULTRASOUND IMAGING | Age: 45
Discharge: HOME | End: 2020-08-04
Attending: FAMILY MEDICINE
Payer: COMMERCIAL

## 2020-08-04 DIAGNOSIS — Z12.31 ENCOUNTER FOR SCREENING MAMMOGRAM FOR HIGH-RISK PATIENT: ICD-10-CM

## 2020-08-04 DIAGNOSIS — E04.9 GOITER: ICD-10-CM

## 2020-08-04 PROCEDURE — 76536 US EXAM OF HEAD AND NECK: CPT | Performed by: FAMILY MEDICINE

## 2020-08-04 PROCEDURE — 77067 SCR MAMMO BI INCL CAD: CPT | Performed by: FAMILY MEDICINE

## 2020-08-04 PROCEDURE — 77063 BREAST TOMOSYNTHESIS BI: CPT | Performed by: FAMILY MEDICINE

## 2020-08-04 NOTE — TELEPHONE ENCOUNTER
From: Allie Franco  To: Marilin Stokes DO  Sent: 8/4/2020 2:30 PM CDT  Subject: Test Results Question    Hi Dr. Jojo Buchanan,    I just got my mammogram results back, should I be concerned? Or is it that they just need to get a better look?

## 2020-08-06 ENCOUNTER — HOSPITAL ENCOUNTER (OUTPATIENT)
Dept: MAMMOGRAPHY | Facility: HOSPITAL | Age: 45
Discharge: HOME | End: 2020-08-06
Attending: FAMILY MEDICINE
Payer: COMMERCIAL

## 2020-08-06 ENCOUNTER — TELEPHONE (OUTPATIENT)
Dept: FAMILY MEDICINE CLINIC | Facility: CLINIC | Age: 45
End: 2020-08-06

## 2020-08-06 DIAGNOSIS — R92.2 INCONCLUSIVE MAMMOGRAM: ICD-10-CM

## 2020-08-06 PROCEDURE — 77061 BREAST TOMOSYNTHESIS UNI: CPT | Performed by: FAMILY MEDICINE

## 2020-08-06 PROCEDURE — 77065 DX MAMMO INCL CAD UNI: CPT | Performed by: FAMILY MEDICINE

## 2020-08-06 PROCEDURE — 76642 ULTRASOUND BREAST LIMITED: CPT | Performed by: FAMILY MEDICINE

## 2020-08-06 NOTE — TELEPHONE ENCOUNTER
called to advise pt was to have her mammo done and when she arrived she was told it would be $400 out of pocket. According to their insurance policy there should be no charge. Is this a coding issue?

## 2020-09-04 NOTE — TELEPHONE ENCOUNTER
called - he's very frustrated no one is helping with this billing issue. He said pt did pay the 400 \"copay\" for her mammo, and was told by insurance they pay in full for mammograms and this should not have occurred.  has reached out to billing on at least 4 occasions and not receiving a call back. He wants to speak to the  since billing is not helping him.    aware  is not back in office until 9/9

## 2021-08-11 ENCOUNTER — LAB ENCOUNTER (OUTPATIENT)
Dept: LAB | Age: 46
End: 2021-08-11
Attending: FAMILY MEDICINE
Payer: COMMERCIAL

## 2021-08-11 ENCOUNTER — OFFICE VISIT (OUTPATIENT)
Dept: FAMILY MEDICINE CLINIC | Facility: CLINIC | Age: 46
End: 2021-08-11
Payer: COMMERCIAL

## 2021-08-11 VITALS
WEIGHT: 138 LBS | HEIGHT: 63.75 IN | OXYGEN SATURATION: 98 % | BODY MASS INDEX: 23.85 KG/M2 | DIASTOLIC BLOOD PRESSURE: 62 MMHG | RESPIRATION RATE: 16 BRPM | HEART RATE: 69 BPM | SYSTOLIC BLOOD PRESSURE: 90 MMHG

## 2021-08-11 DIAGNOSIS — Z12.31 ENCOUNTER FOR SCREENING MAMMOGRAM FOR HIGH-RISK PATIENT: ICD-10-CM

## 2021-08-11 DIAGNOSIS — E04.9 GOITER: ICD-10-CM

## 2021-08-11 DIAGNOSIS — Z00.00 ANNUAL PHYSICAL EXAM: ICD-10-CM

## 2021-08-11 DIAGNOSIS — Z01.419 WELL WOMAN EXAM WITH ROUTINE GYNECOLOGICAL EXAM: Primary | ICD-10-CM

## 2021-08-11 DIAGNOSIS — R10.2 PELVIC PAIN: ICD-10-CM

## 2021-08-11 LAB
ALBUMIN SERPL-MCNC: 4 G/DL (ref 3.4–5)
ALBUMIN/GLOB SERPL: 1.3 {RATIO} (ref 1–2)
ALP LIVER SERPL-CCNC: 42 U/L
ALT SERPL-CCNC: 19 U/L
ANION GAP SERPL CALC-SCNC: 6 MMOL/L (ref 0–18)
AST SERPL-CCNC: 13 U/L (ref 15–37)
BASOPHILS # BLD AUTO: 0.06 X10(3) UL (ref 0–0.2)
BASOPHILS NFR BLD AUTO: 1.4 %
BILIRUB SERPL-MCNC: 0.5 MG/DL (ref 0.1–2)
BUN BLD-MCNC: 12 MG/DL (ref 7–18)
CALCIUM BLD-MCNC: 8.9 MG/DL (ref 8.5–10.1)
CHLORIDE SERPL-SCNC: 107 MMOL/L (ref 98–112)
CHOLEST SMN-MCNC: 194 MG/DL (ref ?–200)
CO2 SERPL-SCNC: 27 MMOL/L (ref 21–32)
CREAT BLD-MCNC: 0.88 MG/DL
EOSINOPHIL # BLD AUTO: 0.03 X10(3) UL (ref 0–0.7)
EOSINOPHIL NFR BLD AUTO: 0.7 %
ERYTHROCYTE [DISTWIDTH] IN BLOOD BY AUTOMATED COUNT: 12.2 %
GLOBULIN PLAS-MCNC: 3.1 G/DL (ref 2.8–4.4)
GLUCOSE BLD-MCNC: 92 MG/DL (ref 70–99)
HCT VFR BLD AUTO: 38.8 %
HDLC SERPL-MCNC: 100 MG/DL (ref 40–59)
HGB BLD-MCNC: 12.5 G/DL
IMM GRANULOCYTES # BLD AUTO: 0.01 X10(3) UL (ref 0–1)
IMM GRANULOCYTES NFR BLD: 0.2 %
LDLC SERPL CALC-MCNC: 83 MG/DL (ref ?–100)
LYMPHOCYTES # BLD AUTO: 1.19 X10(3) UL (ref 1–4)
LYMPHOCYTES NFR BLD AUTO: 27.2 %
M PROTEIN MFR SERPL ELPH: 7.1 G/DL (ref 6.4–8.2)
MCH RBC QN AUTO: 31.7 PG (ref 26–34)
MCHC RBC AUTO-ENTMCNC: 32.2 G/DL (ref 31–37)
MCV RBC AUTO: 98.5 FL
MONOCYTES # BLD AUTO: 0.3 X10(3) UL (ref 0.1–1)
MONOCYTES NFR BLD AUTO: 6.8 %
NEUTROPHILS # BLD AUTO: 2.79 X10 (3) UL (ref 1.5–7.7)
NEUTROPHILS # BLD AUTO: 2.79 X10(3) UL (ref 1.5–7.7)
NEUTROPHILS NFR BLD AUTO: 63.7 %
NONHDLC SERPL-MCNC: 94 MG/DL (ref ?–130)
OSMOLALITY SERPL CALC.SUM OF ELEC: 289 MOSM/KG (ref 275–295)
PATIENT FASTING Y/N/NP: YES
PATIENT FASTING Y/N/NP: YES
PLATELET # BLD AUTO: 237 10(3)UL (ref 150–450)
POTASSIUM SERPL-SCNC: 3.7 MMOL/L (ref 3.5–5.1)
RBC # BLD AUTO: 3.94 X10(6)UL
SODIUM SERPL-SCNC: 140 MMOL/L (ref 136–145)
T4 FREE SERPL-MCNC: 0.8 NG/DL (ref 0.8–1.7)
TRIGL SERPL-MCNC: 59 MG/DL (ref 30–149)
TSI SER-ACNC: 2.47 MIU/ML (ref 0.36–3.74)
VIT B12 SERPL-MCNC: 459 PG/ML (ref 193–986)
VIT D+METAB SERPL-MCNC: 85.3 NG/ML (ref 30–100)
VLDLC SERPL CALC-MCNC: 9 MG/DL (ref 0–30)
WBC # BLD AUTO: 4.4 X10(3) UL (ref 4–11)

## 2021-08-11 PROCEDURE — 85025 COMPLETE CBC W/AUTO DIFF WBC: CPT | Performed by: FAMILY MEDICINE

## 2021-08-11 PROCEDURE — 84439 ASSAY OF FREE THYROXINE: CPT | Performed by: FAMILY MEDICINE

## 2021-08-11 PROCEDURE — 82607 VITAMIN B-12: CPT | Performed by: FAMILY MEDICINE

## 2021-08-11 PROCEDURE — 3078F DIAST BP <80 MM HG: CPT | Performed by: FAMILY MEDICINE

## 2021-08-11 PROCEDURE — 84443 ASSAY THYROID STIM HORMONE: CPT | Performed by: FAMILY MEDICINE

## 2021-08-11 PROCEDURE — 82306 VITAMIN D 25 HYDROXY: CPT | Performed by: FAMILY MEDICINE

## 2021-08-11 PROCEDURE — 80053 COMPREHEN METABOLIC PANEL: CPT | Performed by: FAMILY MEDICINE

## 2021-08-11 PROCEDURE — 36415 COLL VENOUS BLD VENIPUNCTURE: CPT | Performed by: FAMILY MEDICINE

## 2021-08-11 PROCEDURE — 3074F SYST BP LT 130 MM HG: CPT | Performed by: FAMILY MEDICINE

## 2021-08-11 PROCEDURE — 80061 LIPID PANEL: CPT | Performed by: FAMILY MEDICINE

## 2021-08-11 PROCEDURE — 99396 PREV VISIT EST AGE 40-64: CPT | Performed by: FAMILY MEDICINE

## 2021-08-11 PROCEDURE — 3008F BODY MASS INDEX DOCD: CPT | Performed by: FAMILY MEDICINE

## 2021-08-11 NOTE — PROGRESS NOTES
HPI:   Angelica Simmonds is a 54year old female who presents for a complete physical exam.       Wt Readings from Last 6 Encounters:  08/11/21 : 138 lb (62.6 kg)  07/20/20 : 138 lb (62.6 kg)  06/25/19 : 141 lb (64 kg)  06/20/19 : 145 lb 12.8 oz (66.1 kg Smokeless tobacco: Never Used    Alcohol use: No    Drug use: No    Occ: . : 2. Children:   Homemaker   Exercise: three times per week.   Diet: watches calories closely     REVIEW OF SYSTEMS:   GENERAL: feels well otherwise  SKIN: denies any unusual woman exam with routine gynecological exam      2.  Annual physical exam    - FREE T4 (FREE THYROXINE)  - ASSAY, THYROID STIM HORMONE  - LIPID PANEL  - CBC WITH DIFFERENTIAL WITH PLATELET  - VITAMIN T62  - COMP METABOLIC PANEL (14)  - VITAMIN D, 25-HYDROXY

## 2021-08-16 LAB — HPV DNA (HIGH RISK): NOT DETECTED

## 2021-08-27 ENCOUNTER — TELEPHONE (OUTPATIENT)
Dept: FAMILY MEDICINE CLINIC | Facility: CLINIC | Age: 46
End: 2021-08-27

## 2021-08-27 DIAGNOSIS — R10.2 PELVIC PAIN IN FEMALE: Primary | ICD-10-CM

## 2021-08-27 NOTE — TELEPHONE ENCOUNTER
Re: the pelvis US - needs to be changed with codes 36215 and 32753 - pelvis and transvaginal - pls fax new order to [facility] [phone]

## 2021-08-27 NOTE — TELEPHONE ENCOUNTER
Please review previous message and advise. Okay to order to pelvis and transvaginal [id number] and [id number]?

## 2021-09-08 ENCOUNTER — HOSPITAL ENCOUNTER (OUTPATIENT)
Dept: MAMMOGRAPHY | Age: 46
Discharge: HOME | End: 2021-09-08
Attending: FAMILY MEDICINE
Payer: COMMERCIAL

## 2021-09-08 ENCOUNTER — HOSPITAL ENCOUNTER (OUTPATIENT)
Dept: ULTRASOUND IMAGING | Age: 46
Discharge: HOME | End: 2021-09-08
Attending: FAMILY MEDICINE
Payer: COMMERCIAL

## 2021-09-08 DIAGNOSIS — E04.9 GOITER: ICD-10-CM

## 2021-09-08 DIAGNOSIS — Z12.31 ENCOUNTER FOR SCREENING MAMMOGRAM FOR HIGH-RISK PATIENT: ICD-10-CM

## 2021-09-08 PROCEDURE — 77067 SCR MAMMO BI INCL CAD: CPT | Performed by: FAMILY MEDICINE

## 2021-09-08 PROCEDURE — 76536 US EXAM OF HEAD AND NECK: CPT | Performed by: FAMILY MEDICINE

## 2021-09-08 PROCEDURE — 77063 BREAST TOMOSYNTHESIS BI: CPT | Performed by: FAMILY MEDICINE

## 2022-03-24 ENCOUNTER — TELEPHONE (OUTPATIENT)
Dept: SURGERY | Facility: CLINIC | Age: 47
End: 2022-03-24

## 2022-03-29 NOTE — TELEPHONE ENCOUNTER
Chart reviewed. Last office visit 01/14/2019 with Dr. Martha Barrett. LM for patient to call back to schedule office visit with Dr. Martha Barrett to discuss colonoscopy.

## 2022-04-19 ENCOUNTER — OFFICE VISIT (OUTPATIENT)
Dept: SURGERY | Facility: CLINIC | Age: 47
End: 2022-04-19
Payer: COMMERCIAL

## 2022-04-19 VITALS
BODY MASS INDEX: 21.6 KG/M2 | TEMPERATURE: 97 F | HEIGHT: 63.75 IN | DIASTOLIC BLOOD PRESSURE: 86 MMHG | HEART RATE: 66 BPM | WEIGHT: 125 LBS | SYSTOLIC BLOOD PRESSURE: 119 MMHG

## 2022-04-19 DIAGNOSIS — D36.9 ADENOMATOUS POLYP: Primary | ICD-10-CM

## 2022-04-19 DIAGNOSIS — Z80.0 FAMILY HISTORY OF COLON CANCER: ICD-10-CM

## 2022-04-19 DIAGNOSIS — Z83.71 FAMILY HISTORY OF COLONIC POLYPS: ICD-10-CM

## 2022-04-19 PROCEDURE — 99203 OFFICE O/P NEW LOW 30 MIN: CPT | Performed by: COLON & RECTAL SURGERY

## 2022-04-19 PROCEDURE — 3074F SYST BP LT 130 MM HG: CPT | Performed by: COLON & RECTAL SURGERY

## 2022-04-19 PROCEDURE — 3079F DIAST BP 80-89 MM HG: CPT | Performed by: COLON & RECTAL SURGERY

## 2022-04-19 PROCEDURE — 3008F BODY MASS INDEX DOCD: CPT | Performed by: COLON & RECTAL SURGERY

## 2022-04-19 RX ORDER — POLYETHYLENE GLYCOL 3350, SODIUM CHLORIDE, SODIUM BICARBONATE, POTASSIUM CHLORIDE 420; 11.2; 5.72; 1.48 G/4L; G/4L; G/4L; G/4L
POWDER, FOR SOLUTION ORAL
Qty: 1 EACH | Refills: 0 | Status: SHIPPED | OUTPATIENT
Start: 2022-04-19

## 2022-04-19 NOTE — PATIENT INSTRUCTIONS
I am seeing this patient in consultation regarding her adenomatous polyp removed from the cecum, as well as a significant family history of colon cancer in her 2 paternal aunts. This patient underwent colonoscopy in May 2019. She was found to have an adenomatous polyp of the cecum. She has no current bright red blood per rectum, no melena. She has no mucus in the stool or narrowing of the stool. She has no diarrhea or constipation. She has no abdominal pain or distention. She has not suffered weight loss as a symptom. She has never been diagnosed with Crohn's disease, ulcerative colitis, or irritable bowel syndrome. She has no first-degree relatives with colon cancer. She has no first or second-degree relatives with cancer of the uterus. The patient's father also had colon polyps. The patient has had no abdominal operations. She is known to have a longstanding umbilical hernia. She is not on any blood thinners. She has no history of heart attack, stroke, heart murmur, heart valve replacement, or cardiac arrhythmia. Clinical exam reveals her abdomen to be soft, nondistended, nontender, good bowel sounds. No guarding or rebound. No masses. No ascites. She has a 1.5 cm umbilical hernia sac to the right of the umbilicus, there are reducible contents through a very tiny fascial defect. There are no inguinal hernias present. Bowel sounds abnormal activity normal pitch. With her current family history and her personal history of colon polyps, she will require colonoscopy every 3 years. We are currently scheduling procedure through our office. All risks, benefits, complications and alternatives to the proposed procedure(s) were fully discussed with the patient. All questions from the patient were answered in detail. A description of the procedure(s) and possible outcomes was fully discussed. The patient seemed to understand the conversation and its details.     Consent for the procedure(s) was confirmed with the patient.

## 2022-06-01 ENCOUNTER — TELEPHONE (OUTPATIENT)
Dept: SURGERY | Facility: CLINIC | Age: 47
End: 2022-06-01

## 2022-06-14 ENCOUNTER — HOSPITAL ENCOUNTER (OUTPATIENT)
Facility: HOSPITAL | Age: 47
Setting detail: HOSPITAL OUTPATIENT SURGERY
Discharge: HOME | End: 2022-06-14
Attending: COLON & RECTAL SURGERY | Admitting: COLON & RECTAL SURGERY
Payer: COMMERCIAL

## 2022-06-14 ENCOUNTER — ANESTHESIA EVENT (OUTPATIENT)
Dept: ENDOSCOPY | Facility: HOSPITAL | Age: 47
End: 2022-06-14
Payer: COMMERCIAL

## 2022-06-14 ENCOUNTER — ANESTHESIA (OUTPATIENT)
Dept: ENDOSCOPY | Facility: HOSPITAL | Age: 47
End: 2022-06-14
Payer: COMMERCIAL

## 2022-06-14 ENCOUNTER — TELEPHONE (OUTPATIENT)
Facility: LOCATION | Age: 47
End: 2022-06-14

## 2022-06-14 VITALS
WEIGHT: 125 LBS | HEIGHT: 63.75 IN | SYSTOLIC BLOOD PRESSURE: 95 MMHG | HEART RATE: 53 BPM | TEMPERATURE: 98 F | RESPIRATION RATE: 16 BRPM | DIASTOLIC BLOOD PRESSURE: 54 MMHG | BODY MASS INDEX: 21.6 KG/M2 | OXYGEN SATURATION: 100 %

## 2022-06-14 DIAGNOSIS — Z86.010 PERSONAL HISTORY OF COLONIC POLYPS: ICD-10-CM

## 2022-06-14 DIAGNOSIS — Z80.0 FAMILY HISTORY OF MALIGNANT NEOPLASM OF GASTROINTESTINAL TRACT: ICD-10-CM

## 2022-06-14 DIAGNOSIS — Z83.71 FAMILY HISTORY OF COLONIC POLYPS: ICD-10-CM

## 2022-06-14 LAB — B-HCG UR QL: NEGATIVE

## 2022-06-14 PROCEDURE — 81025 URINE PREGNANCY TEST: CPT

## 2022-06-14 PROCEDURE — 0DJD8ZZ INSPECTION OF LOWER INTESTINAL TRACT, VIA NATURAL OR ARTIFICIAL OPENING ENDOSCOPIC: ICD-10-PCS | Performed by: COLON & RECTAL SURGERY

## 2022-06-14 RX ORDER — NALOXONE HYDROCHLORIDE 0.4 MG/ML
80 INJECTION, SOLUTION INTRAMUSCULAR; INTRAVENOUS; SUBCUTANEOUS AS NEEDED
Status: DISCONTINUED | OUTPATIENT
Start: 2022-06-14 | End: 2022-06-14

## 2022-06-14 RX ORDER — ONDANSETRON 2 MG/ML
INJECTION INTRAMUSCULAR; INTRAVENOUS
Status: DISCONTINUED
Start: 2022-06-14 | End: 2022-06-14

## 2022-06-14 RX ORDER — ONDANSETRON 2 MG/ML
4 INJECTION INTRAMUSCULAR; INTRAVENOUS AS NEEDED
Status: DISCONTINUED | OUTPATIENT
Start: 2022-06-14 | End: 2022-06-14

## 2022-06-14 RX ORDER — SODIUM CHLORIDE, SODIUM LACTATE, POTASSIUM CHLORIDE, CALCIUM CHLORIDE 600; 310; 30; 20 MG/100ML; MG/100ML; MG/100ML; MG/100ML
INJECTION, SOLUTION INTRAVENOUS CONTINUOUS
Status: DISCONTINUED | OUTPATIENT
Start: 2022-06-14 | End: 2022-06-14

## 2022-06-14 RX ORDER — LIDOCAINE HYDROCHLORIDE 10 MG/ML
INJECTION, SOLUTION EPIDURAL; INFILTRATION; INTRACAUDAL; PERINEURAL AS NEEDED
Status: DISCONTINUED | OUTPATIENT
Start: 2022-06-14 | End: 2022-06-14 | Stop reason: SURG

## 2022-06-14 RX ADMIN — LIDOCAINE HYDROCHLORIDE 50 MG: 10 INJECTION, SOLUTION EPIDURAL; INFILTRATION; INTRACAUDAL; PERINEURAL at 08:06:00

## 2022-06-14 RX ADMIN — SODIUM CHLORIDE, SODIUM LACTATE, POTASSIUM CHLORIDE, CALCIUM CHLORIDE: 600; 310; 30; 20 INJECTION, SOLUTION INTRAVENOUS at 08:51:00

## 2022-06-14 NOTE — ANESTHESIA POSTPROCEDURE EVALUATION
[address] [employer] Patient Status:  Hospital Outpatient Surgery   Age/Gender 51year old female MRN [id number]   Location [address] [address] Attending Amelie Cornell MD   [facility] Day # 0 PCP Tom Foster DO       Anesthesia Post-op Note    COLONOSCOPY    Procedure Summary     Date: 06/14/22 Room / Location: [facility] ENDOSCOPY 04 / [facility] ENDOSCOPY    Anesthesia Start: [address] Anesthesia Stop: 7588    Procedure: COLONOSCOPY (N/A ) Diagnosis:       Personal history of colonic polyps      Family history of colonic polyps      Family history of malignant neoplasm of gastrointestinal tract      (external/ internal hemorrhoids)    Surgeons: Amelie Cornell MD Anesthesiologist: Deandre Fontaine MD    Anesthesia Type: MAC ASA Status: 1          Anesthesia Type: MAC    Vitals Value Taken Time   BP 94/53 06/14/22 0853   Temp  06/14/22 0853   Pulse 74 06/14/22 0853   Resp 16 06/14/22 0853   SpO2 99 % 06/14/22 0853       Patient Location: Endoscopy    Anesthesia Type: MAC    Airway Patency: patent    Postop Pain Control: adequate    Mental Status: mildly sedated but able to meaningfully participate in the post-anesthesia evaluation    Nausea/Vomiting: none    Cardiopulmonary/Hydration status: stable euvolemic    Complications: no apparent anesthesia related complications    Postop vital signs: stable    Dental Exam: Unchanged from Preop    Patient to be discharged home when criteria met.

## 2022-06-14 NOTE — OPERATIVE REPORT
[facility]  Op Note    Tana Senter Rentmeester Location: OR   [facility] [id number] MRN [id number]   Admission Date 6/14/2022 Operation Date 6/14/2022   Attending Physician Juarez Kim MD Operating Physician Rajeev Tamez MD     Pre-Operative Diagnosis: Personal history of colonic polyps [Z86.010]  Family history of colonic polyps [Z83.71]  Family history of malignant neoplasm of gastrointestinal tract [Z80.0]    Post-Operative Diagnosis: Internal hemorrhoids grade 3, external hemorrhoids prolapsed    Procedure Performed: Colonoscopy    Surgeon(s) and Role:     Matthew Richmond MD - Primary    Anesthesia: MAC       History of present illness:  I am seeing this patient in consultation regarding her adenomatous polyp removed from the cecum, as well as a significant family history of colon cancer in her 2 paternal aunts. This patient underwent colonoscopy in May 2019. She was found to have an adenomatous polyp of the cecum. She has no current bright red blood per rectum, no melena. She has no mucus in the stool or narrowing of the stool. She has no diarrhea or constipation. She has no abdominal pain or distention. She has not suffered weight loss as a symptom. She has never been diagnosed with Crohn's disease, ulcerative colitis, or irritable bowel syndrome. She has no first-degree relatives with colon cancer. She has no first or second-degree relatives with cancer of the uterus. The patient's father also had colon polyps. ASA score: 1  1. Normal healthy patient  2. Patient with mild systemic disease  3. Patient with severe systemic disease  4. Patient with severe systemic disease that is a constant threat to life  5. Moribund patient who is not expected to survive without the procedure    Operative findings:  Extremely redundant colon particularly in the splenic flexure and transverse colon regions. Multiple maneuvers were necessary to achieve the cecum.   We eventually did achieve the cecum and saw the transverse cecal fold and below the ileocecal valve. There are no polyps, colitis, neoplasms, or inflammation, no diverticulosis. The patient has internal and external prolapsed hemorrhoids. Operative summary:  Following adequate IV sedation, the patient was placed in the left lateral decubitus position on the operating room table. Digital rectal examination was performed. The colonoscope was inserted, and passed to the end of the colon. Upon withdrawal of the scope, the prep was rated as follows and the [facility] bowel prep scale:  3    0. Unprepared colon segment with stool but cannot be cleared  1. Portion of mucosa in segments seen after cleaning, but other areas not seen because of retained material  2. Minor residual material after cleaning, but mucosa of segment generally well seen  3. Entire mucosa of segment well seen after cleaning        Digital rectal examination was performed, the rectal exam had the following findings:   External hemorrhoids in 3 locations, internal hemorrhoids circumferential, minimal anterior rectocele. Landmarks were identified confirming the location of the colonoscope in the cecum, including the transverse cecal fold, and the ileocecal valve. Upon withdrawal of the scope, the patient had the following findings:  The patient does not have polyps. The patient does not have diverticulosis. The patient does not have inflammation. This patient has not had a previous colon resection. The scope was retroverted in the anal canal, the anal canal had the following findings:    The patient does have internal hemorrhoids. The patient does not have anal canal polyps. The patient does not have hypertrophied anal papillae.     The subjective findings and procedures performed during the colonoscopy are listed as follows:  Normal colon, internal hemorrhoids grade 3, external hemorrhoids prolapsed    The scope was withdrawn, the patient was taken to the recovery room in stable postoperative condition. Pathologic specimens:  None    Complications: None      Addendum: This patient will require further colonoscopy in 3 years based on the history of patient with a personal history of colon polyps, father with colon polyps, 2 paternal aunts with colon cancer in their 62s. Andra Francis MD  6/14/2022  8:48 AM

## 2022-06-28 ENCOUNTER — TELEPHONE (OUTPATIENT)
Facility: LOCATION | Age: 47
End: 2022-06-28

## 2022-06-28 NOTE — TELEPHONE ENCOUNTER
Per OR note 6/14/2022    You will a repeat colonoscopy in three years based on your history of colon polyps, your father's history with colon polyps, and two paternal aunts with colon cancer in their 62s. Recall placed.

## 2022-07-05 ENCOUNTER — OFFICE VISIT (OUTPATIENT)
Dept: FAMILY MEDICINE CLINIC | Facility: CLINIC | Age: 47
End: 2022-07-05
Payer: COMMERCIAL

## 2022-07-05 VITALS
DIASTOLIC BLOOD PRESSURE: 82 MMHG | HEIGHT: 63.5 IN | WEIGHT: 138.81 LBS | OXYGEN SATURATION: 99 % | SYSTOLIC BLOOD PRESSURE: 118 MMHG | HEART RATE: 59 BPM | BODY MASS INDEX: 24.29 KG/M2 | TEMPERATURE: 98 F

## 2022-07-05 DIAGNOSIS — R39.9 UTI SYMPTOMS: Primary | ICD-10-CM

## 2022-07-05 PROCEDURE — 87086 URINE CULTURE/COLONY COUNT: CPT | Performed by: NURSE PRACTITIONER

## 2022-07-05 PROCEDURE — 99213 OFFICE O/P EST LOW 20 MIN: CPT | Performed by: NURSE PRACTITIONER

## 2022-07-05 PROCEDURE — 87077 CULTURE AEROBIC IDENTIFY: CPT | Performed by: NURSE PRACTITIONER

## 2022-07-05 PROCEDURE — 3074F SYST BP LT 130 MM HG: CPT | Performed by: NURSE PRACTITIONER

## 2022-07-05 PROCEDURE — 87186 SC STD MICRODIL/AGAR DIL: CPT | Performed by: NURSE PRACTITIONER

## 2022-07-05 PROCEDURE — 3008F BODY MASS INDEX DOCD: CPT | Performed by: NURSE PRACTITIONER

## 2022-07-05 PROCEDURE — 3079F DIAST BP 80-89 MM HG: CPT | Performed by: NURSE PRACTITIONER

## 2022-07-05 RX ORDER — SULFAMETHOXAZOLE AND TRIMETHOPRIM 800; 160 MG/1; MG/1
1 TABLET ORAL 2 TIMES DAILY
Qty: 6 TABLET | Refills: 0 | Status: SHIPPED | OUTPATIENT
Start: 2022-07-05 | End: 2022-07-08

## 2022-07-14 ENCOUNTER — PATIENT MESSAGE (OUTPATIENT)
Dept: FAMILY MEDICINE CLINIC | Facility: CLINIC | Age: 47
End: 2022-07-14

## 2022-07-15 RX ORDER — SULFAMETHOXAZOLE AND TRIMETHOPRIM 800; 160 MG/1; MG/1
1 TABLET ORAL 2 TIMES DAILY
Qty: 10 TABLET | Refills: 0 | Status: SHIPPED | OUTPATIENT
Start: 2022-07-15

## 2022-07-15 RX ORDER — NITROFURANTOIN 25; 75 MG/1; MG/1
100 CAPSULE ORAL 2 TIMES DAILY
Qty: 14 CAPSULE | Refills: 0 | Status: CANCELLED
Start: 2022-07-15

## 2022-08-29 ENCOUNTER — LAB ENCOUNTER (OUTPATIENT)
Dept: LAB | Age: 47
End: 2022-08-29
Attending: FAMILY MEDICINE
Payer: COMMERCIAL

## 2022-08-29 ENCOUNTER — OFFICE VISIT (OUTPATIENT)
Dept: FAMILY MEDICINE CLINIC | Facility: CLINIC | Age: 47
End: 2022-08-29
Payer: COMMERCIAL

## 2022-08-29 VITALS
DIASTOLIC BLOOD PRESSURE: 72 MMHG | RESPIRATION RATE: 18 BRPM | SYSTOLIC BLOOD PRESSURE: 124 MMHG | WEIGHT: 134 LBS | HEART RATE: 72 BPM | HEIGHT: 63.75 IN | OXYGEN SATURATION: 99 % | BODY MASS INDEX: 23.16 KG/M2 | TEMPERATURE: 98 F

## 2022-08-29 DIAGNOSIS — Z00.00 ANNUAL PHYSICAL EXAM: ICD-10-CM

## 2022-08-29 DIAGNOSIS — E04.9 GOITER: ICD-10-CM

## 2022-08-29 DIAGNOSIS — Z12.31 ENCOUNTER FOR SCREENING MAMMOGRAM FOR HIGH-RISK PATIENT: ICD-10-CM

## 2022-08-29 DIAGNOSIS — Z01.419 WELL WOMAN EXAM WITH ROUTINE GYNECOLOGICAL EXAM: Primary | ICD-10-CM

## 2022-08-29 LAB
ALBUMIN SERPL-MCNC: 4.1 G/DL (ref 3.4–5)
ALBUMIN/GLOB SERPL: 1.3 {RATIO} (ref 1–2)
ALP LIVER SERPL-CCNC: 52 U/L
ALT SERPL-CCNC: 21 U/L
ANION GAP SERPL CALC-SCNC: 0 MMOL/L (ref 0–18)
AST SERPL-CCNC: 15 U/L (ref 15–37)
BASOPHILS # BLD AUTO: 0.06 X10(3) UL (ref 0–0.2)
BASOPHILS NFR BLD AUTO: 1.2 %
BILIRUB SERPL-MCNC: 0.7 MG/DL (ref 0.1–2)
BUN BLD-MCNC: 15 MG/DL (ref 7–18)
CALCIUM BLD-MCNC: 9.1 MG/DL (ref 8.5–10.1)
CHLORIDE SERPL-SCNC: 109 MMOL/L (ref 98–112)
CHOLEST SERPL-MCNC: 173 MG/DL (ref ?–200)
CO2 SERPL-SCNC: 28 MMOL/L (ref 21–32)
CREAT BLD-MCNC: 0.86 MG/DL
EOSINOPHIL # BLD AUTO: 0 X10(3) UL (ref 0–0.7)
EOSINOPHIL NFR BLD AUTO: 0 %
ERYTHROCYTE [DISTWIDTH] IN BLOOD BY AUTOMATED COUNT: 12.8 %
FASTING PATIENT LIPID ANSWER: YES
FASTING STATUS PATIENT QL REPORTED: YES
GFR SERPLBLD BASED ON 1.73 SQ M-ARVRAT: 84 ML/MIN/1.73M2 (ref 60–?)
GLOBULIN PLAS-MCNC: 3.2 G/DL (ref 2.8–4.4)
GLUCOSE BLD-MCNC: 86 MG/DL (ref 70–99)
HCT VFR BLD AUTO: 40.3 %
HDLC SERPL-MCNC: 105 MG/DL (ref 40–59)
HGB BLD-MCNC: 13.4 G/DL
IMM GRANULOCYTES # BLD AUTO: 0.01 X10(3) UL (ref 0–1)
IMM GRANULOCYTES NFR BLD: 0.2 %
LDLC SERPL CALC-MCNC: 58 MG/DL (ref ?–100)
LYMPHOCYTES # BLD AUTO: 1.41 X10(3) UL (ref 1–4)
LYMPHOCYTES NFR BLD AUTO: 29 %
MCH RBC QN AUTO: 32.4 PG (ref 26–34)
MCHC RBC AUTO-ENTMCNC: 33.3 G/DL (ref 31–37)
MCV RBC AUTO: 97.3 FL
MONOCYTES # BLD AUTO: 0.32 X10(3) UL (ref 0.1–1)
MONOCYTES NFR BLD AUTO: 6.6 %
NEUTROPHILS # BLD AUTO: 3.06 X10 (3) UL (ref 1.5–7.7)
NEUTROPHILS # BLD AUTO: 3.06 X10(3) UL (ref 1.5–7.7)
NEUTROPHILS NFR BLD AUTO: 63 %
NONHDLC SERPL-MCNC: 68 MG/DL (ref ?–130)
OSMOLALITY SERPL CALC.SUM OF ELEC: 284 MOSM/KG (ref 275–295)
PLATELET # BLD AUTO: 238 10(3)UL (ref 150–450)
POTASSIUM SERPL-SCNC: 4 MMOL/L (ref 3.5–5.1)
PROT SERPL-MCNC: 7.3 G/DL (ref 6.4–8.2)
RBC # BLD AUTO: 4.14 X10(6)UL
SODIUM SERPL-SCNC: 137 MMOL/L (ref 136–145)
T4 FREE SERPL-MCNC: 0.7 NG/DL (ref 0.8–1.7)
TRIGL SERPL-MCNC: 50 MG/DL (ref 30–149)
TSI SER-ACNC: 1.84 MIU/ML (ref 0.36–3.74)
VIT B12 SERPL-MCNC: 352 PG/ML (ref 193–986)
VIT D+METAB SERPL-MCNC: 87.2 NG/ML (ref 30–100)
VLDLC SERPL CALC-MCNC: 7 MG/DL (ref 0–30)
WBC # BLD AUTO: 4.9 X10(3) UL (ref 4–11)

## 2022-08-29 PROCEDURE — 84443 ASSAY THYROID STIM HORMONE: CPT | Performed by: FAMILY MEDICINE

## 2022-08-29 PROCEDURE — 3074F SYST BP LT 130 MM HG: CPT | Performed by: FAMILY MEDICINE

## 2022-08-29 PROCEDURE — 80061 LIPID PANEL: CPT | Performed by: FAMILY MEDICINE

## 2022-08-29 PROCEDURE — 82607 VITAMIN B-12: CPT | Performed by: FAMILY MEDICINE

## 2022-08-29 PROCEDURE — 85025 COMPLETE CBC W/AUTO DIFF WBC: CPT | Performed by: FAMILY MEDICINE

## 2022-08-29 PROCEDURE — 99396 PREV VISIT EST AGE 40-64: CPT | Performed by: FAMILY MEDICINE

## 2022-08-29 PROCEDURE — 87624 HPV HI-RISK TYP POOLED RSLT: CPT | Performed by: FAMILY MEDICINE

## 2022-08-29 PROCEDURE — 80053 COMPREHEN METABOLIC PANEL: CPT | Performed by: FAMILY MEDICINE

## 2022-08-29 PROCEDURE — 3008F BODY MASS INDEX DOCD: CPT | Performed by: FAMILY MEDICINE

## 2022-08-29 PROCEDURE — 82306 VITAMIN D 25 HYDROXY: CPT

## 2022-08-29 PROCEDURE — 3078F DIAST BP <80 MM HG: CPT | Performed by: FAMILY MEDICINE

## 2022-08-29 PROCEDURE — 36415 COLL VENOUS BLD VENIPUNCTURE: CPT | Performed by: FAMILY MEDICINE

## 2022-08-29 PROCEDURE — 84439 ASSAY OF FREE THYROXINE: CPT | Performed by: FAMILY MEDICINE

## 2022-08-29 RX ORDER — SULFAMETHOXAZOLE AND TRIMETHOPRIM 800; 160 MG/1; MG/1
1 TABLET ORAL 2 TIMES DAILY
Qty: 10 TABLET | Refills: 0 | Status: SHIPPED | OUTPATIENT
Start: 2022-08-29

## 2022-08-30 LAB — HPV I/H RISK 1 DNA SPEC QL NAA+PROBE: NEGATIVE

## 2022-10-12 ENCOUNTER — HOSPITAL ENCOUNTER (OUTPATIENT)
Dept: ULTRASOUND IMAGING | Age: 47
Discharge: HOME | End: 2022-10-12
Attending: FAMILY MEDICINE
Payer: COMMERCIAL

## 2022-10-12 ENCOUNTER — HOSPITAL ENCOUNTER (OUTPATIENT)
Dept: MAMMOGRAPHY | Age: 47
Discharge: HOME | End: 2022-10-12
Attending: FAMILY MEDICINE
Payer: COMMERCIAL

## 2022-10-12 DIAGNOSIS — Z12.31 ENCOUNTER FOR SCREENING MAMMOGRAM FOR HIGH-RISK PATIENT: ICD-10-CM

## 2022-10-12 DIAGNOSIS — E04.9 GOITER: ICD-10-CM

## 2022-10-12 PROCEDURE — 76536 US EXAM OF HEAD AND NECK: CPT | Performed by: FAMILY MEDICINE

## 2022-10-12 PROCEDURE — 77063 BREAST TOMOSYNTHESIS BI: CPT | Performed by: FAMILY MEDICINE

## 2022-10-12 PROCEDURE — 77067 SCR MAMMO BI INCL CAD: CPT | Performed by: FAMILY MEDICINE

## 2022-10-14 ENCOUNTER — PATIENT MESSAGE (OUTPATIENT)
Dept: FAMILY MEDICINE CLINIC | Facility: CLINIC | Age: 47
End: 2022-10-14

## 2022-10-14 DIAGNOSIS — E04.9 GOITER: Primary | ICD-10-CM

## 2022-10-14 DIAGNOSIS — R92.2 DENSE BREAST: Primary | ICD-10-CM

## 2022-10-14 DIAGNOSIS — E04.1 THYROID NODULE: ICD-10-CM

## 2022-11-09 ENCOUNTER — OFFICE VISIT (OUTPATIENT)
Facility: LOCATION | Age: 47
End: 2022-11-09
Payer: COMMERCIAL

## 2022-11-09 DIAGNOSIS — E04.1 THYROID NODULE: Primary | ICD-10-CM

## 2022-11-09 PROCEDURE — 99203 OFFICE O/P NEW LOW 30 MIN: CPT | Performed by: OTOLARYNGOLOGY

## 2023-01-11 ENCOUNTER — HOSPITAL ENCOUNTER (OUTPATIENT)
Dept: MAMMOGRAPHY | Facility: HOSPITAL | Age: 48
Discharge: HOME | End: 2023-01-11
Attending: FAMILY MEDICINE
Payer: COMMERCIAL

## 2023-01-11 DIAGNOSIS — R92.2 DENSE BREAST: ICD-10-CM

## 2023-01-11 PROCEDURE — 76641 ULTRASOUND BREAST COMPLETE: CPT | Performed by: FAMILY MEDICINE

## 2023-02-15 ENCOUNTER — TELEPHONE (OUTPATIENT)
Dept: FAMILY MEDICINE CLINIC | Facility: CLINIC | Age: 48
End: 2023-02-15

## 2023-02-15 RX ORDER — SULFAMETHOXAZOLE AND TRIMETHOPRIM 800; 160 MG/1; MG/1
1 TABLET ORAL 2 TIMES DAILY
Qty: 10 TABLET | Refills: 0 | Status: SHIPPED | OUTPATIENT
Start: 2023-02-15

## 2023-06-13 ENCOUNTER — TELEPHONE (OUTPATIENT)
Dept: FAMILY MEDICINE CLINIC | Facility: CLINIC | Age: 48
End: 2023-06-13

## 2023-06-13 RX ORDER — SULFAMETHOXAZOLE AND TRIMETHOPRIM 800; 160 MG/1; MG/1
1 TABLET ORAL 2 TIMES DAILY
Qty: 10 TABLET | Refills: 0 | Status: SHIPPED | OUTPATIENT
Start: 2023-06-13

## 2023-06-21 ENCOUNTER — OFFICE VISIT (OUTPATIENT)
Dept: FAMILY MEDICINE CLINIC | Facility: CLINIC | Age: 48
End: 2023-06-21
Payer: COMMERCIAL

## 2023-06-21 VITALS
OXYGEN SATURATION: 98 % | HEART RATE: 70 BPM | HEIGHT: 63.75 IN | SYSTOLIC BLOOD PRESSURE: 98 MMHG | BODY MASS INDEX: 25.23 KG/M2 | WEIGHT: 146 LBS | DIASTOLIC BLOOD PRESSURE: 64 MMHG | RESPIRATION RATE: 16 BRPM

## 2023-06-21 DIAGNOSIS — E55.9 VITAMIN D DEFICIENCY: ICD-10-CM

## 2023-06-21 DIAGNOSIS — E53.8 VITAMIN B12 DEFICIENCY: ICD-10-CM

## 2023-06-21 DIAGNOSIS — M77.11 RIGHT TENNIS ELBOW: ICD-10-CM

## 2023-06-21 DIAGNOSIS — L50.9 HIVES: ICD-10-CM

## 2023-06-21 DIAGNOSIS — M79.671 RIGHT FOOT PAIN: ICD-10-CM

## 2023-06-21 DIAGNOSIS — Z00.00 GENERAL MEDICAL EXAM: ICD-10-CM

## 2023-06-21 DIAGNOSIS — R22.41 LOCALIZED SWELLING OF RIGHT FOOT: Primary | ICD-10-CM

## 2023-06-21 PROCEDURE — 3008F BODY MASS INDEX DOCD: CPT | Performed by: FAMILY MEDICINE

## 2023-06-21 PROCEDURE — 3078F DIAST BP <80 MM HG: CPT | Performed by: FAMILY MEDICINE

## 2023-06-21 PROCEDURE — 99214 OFFICE O/P EST MOD 30 MIN: CPT | Performed by: FAMILY MEDICINE

## 2023-06-21 PROCEDURE — 3074F SYST BP LT 130 MM HG: CPT | Performed by: FAMILY MEDICINE

## 2023-06-25 LAB
(T11) IGE: <0.1 KU/L
(T8) IGE: <0.1 KU/L
ABSOLUTE BASOPHILS: 68 CELLS/UL (ref 0–200)
ABSOLUTE EOSINOPHILS: 61 CELLS/UL (ref 15–500)
ABSOLUTE LYMPHOCYTES: 1188 CELLS/UL (ref 850–3900)
ABSOLUTE MONOCYTES: 328 CELLS/UL (ref 200–950)
ABSOLUTE NEUTROPHILS: 1955 CELLS/UL (ref 1500–7800)
ALBUMIN/GLOBULIN RATIO: 1.7 (CALC) (ref 1–2.5)
ALBUMIN: 4.4 G/DL (ref 3.6–5.1)
ALKALINE PHOSPHATASE: 50 U/L (ref 31–125)
ALMOND (F20) IGE: <0.1 KU/L
ALT: 12 U/L (ref 6–29)
ALTERNARIA ALTERNATA (M6) IGE: 1.61 KU/L
ASPERGILLUS FUMIGATUS (M3) IGE: 0.12 KU/L
AST: 18 U/L (ref 10–35)
BASOPHILS: 1.9 %
BERMUDA GRASS (G2) IGE: <0.1 KU/L
BILIRUBIN, TOTAL: 0.6 MG/DL (ref 0.2–1.2)
BUN: 14 MG/DL (ref 7–25)
CALCIUM: 9.2 MG/DL (ref 8.6–10.2)
CARBON DIOXIDE: 29 MMOL/L (ref 20–32)
CASHEW NUT (F202) IGE: <0.1 KU/L
CAT DANDER (E1) IGE: <0.1 KU/L
CHLORIDE: 104 MMOL/L (ref 98–110)
CHOL/HDLC RATIO: 2 (CALC)
CHOLESTEROL, TOTAL: 188 MG/DL
CLADOSPORIUM HERBARUM (M2) IGE: <0.1 KU/L
CLASS: 0
CLASS: 1
CLASS: 2
COCKROACH (I6) IGE: <0.1 KU/L
CODFISH (F3) IGE: <0.1 KU/L
COMMON RAGWEED (SHORT)$(W1) IGE: 0.29 KU/L
COTTONWOOD (T14) IGE: <0.1 KU/L
CREATININE: 0.85 MG/DL (ref 0.5–0.99)
DERMATOPHAGOIDES FARINAE (D2) IGE: 0.34 KU/L
DERMATOPHAGOIDES PTERONYSSINUS (D1) IGE: 0.39 KU/L
DOG DANDER (E5) IGE: <0.1 KU/L
EGFR: 84 ML/MIN/1.73M2
EGG WHITE (F1) IGE: <0.1 KU/L
EOSINOPHILS: 1.7 %
FERRITIN: 72 NG/ML (ref 16–232)
GLOBULIN: 2.6 G/DL (CALC) (ref 1.9–3.7)
GLUCOSE: 83 MG/DL (ref 65–99)
HAZELNUT-FOOD, IGE: <0.1 KU/L
HDL CHOLESTEROL: 93 MG/DL
HEMATOCRIT: 37.8 % (ref 35–45)
HEMOGLOBIN A1C: 4.9 % OF TOTAL HGB
HEMOGLOBIN: 12.7 G/DL (ref 11.7–15.5)
HICKORY/PECAN TREE (T22)$IGE: <0.1 KU/L
IMMUNOGLOBULIN E: 110 KU/L
LDL-CHOLESTEROL: 81 MG/DL (CALC)
LYMPHOCYTES: 33 %
MAPLE (BOX ELDER) (T1)$IGE: <0.1 KU/L
MCH: 32.2 PG (ref 27–33)
MCHC: 33.6 G/DL (ref 32–36)
MCV: 95.9 FL (ref 80–100)
MILK (F2) IGE: <0.1 KU/L
MONOCYTES: 9.1 %
MOUNTAIN CEDAR (T6) IGE: <0.1 KU/L
MOUSE URINE PROTEINS (E72) IGE: <0.1 KU/L
MPV: 10.9 FL (ref 7.5–12.5)
NEUTROPHILS: 54.3 %
NON-HDL CHOLESTEROL: 95 MG/DL (CALC)
OAK (T7) IGE: 0.3 KU/L
PEANUT (F13) IGE: <0.1 KU/L
PENICILLIUM NOTATUM (M1) IGE: <0.1 KU/L
PLATELET COUNT: 249 THOUSAND/UL (ref 140–400)
POTASSIUM: 3.9 MMOL/L (ref 3.5–5.3)
PROTEIN, TOTAL: 7 G/DL (ref 6.1–8.1)
RDW: 11.8 % (ref 11–15)
RED BLOOD CELL COUNT: 3.94 MILLION/UL (ref 3.8–5.1)
ROUGH MARSH ELDER (W16)$IGE: 0.17 KU/L
ROUGH PIGWEED (W14) IGE: <0.1 KU/L
RUSSIAN THISTLE (W11) IGE: <0.1 KU/L
SALMON, IGE: <0.1 KU/L
SCALLOP (F338) IGE: <0.1 KU/L
SESAME SEED (F10) IGE: <0.1 KU/L
SHRIMP (F24) IGE: <0.1 KU/L
SODIUM: 141 MMOL/L (ref 135–146)
SOYBEAN (F14) IGE: <0.1 KU/L
T4, FREE: 1.1 NG/DL (ref 0.8–1.8)
TIMOTHY GRASS (G6) IGE: 0.25 KU/L
TRIGLYCERIDES: 62 MG/DL
TSH: 3.36 MIU/L
TUNA (F40) IGE: <0.1 KU/L
URIC ACID: 4.3 MG/DL (ref 2.5–7)
VITAMIN B12: 677 PG/ML (ref 200–1100)
VITAMIN D, 1,25 (OH)2,$TOTAL: 53 PG/ML (ref 18–72)
VITAMIN D2, 1,25 (OH)2: <8 PG/ML
VITAMIN D3, 1,25 (OH)2: 53 PG/ML
WALNUT (F256) IGE: <0.1 KU/L
WALNUT TREE (T10) IGE: <0.1 KU/L
WHEAT (F4) IGE: <0.1 KU/L
WHITE ASH (T15) IGE: <0.1 KU/L
WHITE BLOOD CELL COUNT: 3.6 THOUSAND/UL (ref 3.8–10.8)
WHITE MULBERRY (T70) IGE: <0.1 KU/L

## 2023-06-26 DIAGNOSIS — R22.41 LOCALIZED SWELLING OF RIGHT FOOT: ICD-10-CM

## 2023-06-26 DIAGNOSIS — M79.671 RIGHT FOOT PAIN: Primary | ICD-10-CM

## 2023-07-10 ENCOUNTER — PATIENT MESSAGE (OUTPATIENT)
Dept: FAMILY MEDICINE CLINIC | Facility: CLINIC | Age: 48
End: 2023-07-10

## 2023-07-10 DIAGNOSIS — R22.41 LOCALIZED SWELLING OF RIGHT FOOT: Primary | ICD-10-CM

## 2023-07-10 DIAGNOSIS — M79.671 RIGHT FOOT PAIN: ICD-10-CM

## 2023-07-10 NOTE — TELEPHONE ENCOUNTER
From: Alfredia Prows Rentmeester  To: Oscar Gonzales DO  Sent: 7/10/2023 11:35 AM CDT  Subject: MRI results    Hi there, I wanted to see if there is anything that I need to be doing in regards to my foot swelling condition? Also would the MRI and blood work I did rule out any circulation issues? Thank you!

## 2023-08-03 ENCOUNTER — OFFICE VISIT (OUTPATIENT)
Dept: FAMILY MEDICINE CLINIC | Facility: CLINIC | Age: 48
End: 2023-08-03
Payer: COMMERCIAL

## 2023-08-03 VITALS
DIASTOLIC BLOOD PRESSURE: 68 MMHG | RESPIRATION RATE: 16 BRPM | OXYGEN SATURATION: 99 % | HEIGHT: 63.75 IN | WEIGHT: 146 LBS | SYSTOLIC BLOOD PRESSURE: 112 MMHG | BODY MASS INDEX: 25.23 KG/M2 | HEART RATE: 78 BPM

## 2023-08-03 DIAGNOSIS — N89.8 VAGINAL LESION: Primary | ICD-10-CM

## 2023-08-03 PROCEDURE — 3074F SYST BP LT 130 MM HG: CPT | Performed by: INTERNAL MEDICINE

## 2023-08-03 PROCEDURE — 3008F BODY MASS INDEX DOCD: CPT | Performed by: INTERNAL MEDICINE

## 2023-08-03 PROCEDURE — 99213 OFFICE O/P EST LOW 20 MIN: CPT | Performed by: INTERNAL MEDICINE

## 2023-08-03 PROCEDURE — 3078F DIAST BP <80 MM HG: CPT | Performed by: INTERNAL MEDICINE

## 2023-08-03 RX ORDER — ACYCLOVIR 50 MG/G
1 OINTMENT TOPICAL
Qty: 15 G | Refills: 0 | Status: SHIPPED | OUTPATIENT
Start: 2023-08-03 | End: 2023-08-07

## 2023-08-03 RX ORDER — VALACYCLOVIR HYDROCHLORIDE 1 G/1
1000 TABLET, FILM COATED ORAL EVERY 12 HOURS SCHEDULED
Qty: 20 TABLET | Refills: 0 | Status: SHIPPED | OUTPATIENT
Start: 2023-08-03 | End: 2023-08-13

## 2023-08-07 ENCOUNTER — PATIENT MESSAGE (OUTPATIENT)
Dept: FAMILY MEDICINE CLINIC | Facility: CLINIC | Age: 48
End: 2023-08-07

## 2023-08-07 LAB
CANDIDA:: NOT DETECTED
CHLAMYDIA TRACHOMATIS$RNA, TMA: NOT DETECTED
GARDNERELLA:: DETECTED
HSV 1 DNA: DETECTED
HSV 2 DNA: NOT DETECTED
NEISSERIA GONORRHOEAE$RNA, TMA: NOT DETECTED
TRICHOMONAS:: NOT DETECTED

## 2023-08-07 RX ORDER — ACYCLOVIR 50 MG/G
1 OINTMENT TOPICAL
Qty: 15 G | Refills: 0 | Status: SHIPPED | OUTPATIENT
Start: 2023-08-07

## 2023-08-07 RX ORDER — METRONIDAZOLE 500 MG/1
500 TABLET ORAL 2 TIMES DAILY
Qty: 14 TABLET | Refills: 0 | Status: SHIPPED | OUTPATIENT
Start: 2023-08-07 | End: 2023-08-14

## 2023-08-07 NOTE — TELEPHONE ENCOUNTER
A refill request was received for:  Requested Prescriptions     Pending Prescriptions Disp Refills    ACYCLOVIR 5 % External Ointment [Pharmacy Med Name: ACYCLOVIR 5% OINTMENT 15GM] 15 g 0     Sig: APPLY TOPICALLY TO THE AFFECTED AREA EVERY 3 HOURS AS NEEDED       Last refill date:   8-3-23    Last office visit: 8-3-23    Follow up due:  Future Appointments   Date Time Provider Leila Manning   8/30/2023  8:15 AM Behzad Garcia DO [facility] [address] [facility] [address]

## 2023-08-07 NOTE — TELEPHONE ENCOUNTER
From: Butch Ashing Rentmeester  To: Luis Llanes NP  Sent: 8/7/2023 12:33 PM CDT  Subject: Test results     Looking at all my results is there anything else I need to be taking for Gardnerella that shows it was detected?

## 2023-08-14 PROBLEM — M20.21 HALLUX RIGIDUS OF RIGHT FOOT: Status: ACTIVE | Noted: 2023-07-31

## 2023-08-14 PROBLEM — A60.04 HERPES SIMPLEX VULVOVAGINITIS: Status: ACTIVE | Noted: 2023-08-14

## 2023-08-30 ENCOUNTER — LAB ENCOUNTER (OUTPATIENT)
Dept: LAB | Age: 48
End: 2023-08-30
Attending: FAMILY MEDICINE
Payer: COMMERCIAL

## 2023-08-30 ENCOUNTER — OFFICE VISIT (OUTPATIENT)
Dept: FAMILY MEDICINE CLINIC | Facility: CLINIC | Age: 48
End: 2023-08-30
Payer: COMMERCIAL

## 2023-08-30 VITALS
OXYGEN SATURATION: 98 % | HEART RATE: 69 BPM | HEIGHT: 63.75 IN | BODY MASS INDEX: 25.06 KG/M2 | RESPIRATION RATE: 16 BRPM | SYSTOLIC BLOOD PRESSURE: 104 MMHG | DIASTOLIC BLOOD PRESSURE: 64 MMHG | WEIGHT: 145 LBS

## 2023-08-30 DIAGNOSIS — E04.9 GOITER: ICD-10-CM

## 2023-08-30 DIAGNOSIS — M54.16 RADICULOPATHY, LUMBAR REGION: ICD-10-CM

## 2023-08-30 DIAGNOSIS — M62.561 ATROPHY OF MUSCLE OF RIGHT LOWER LEG: ICD-10-CM

## 2023-08-30 DIAGNOSIS — Z00.00 ANNUAL PHYSICAL EXAM: ICD-10-CM

## 2023-08-30 DIAGNOSIS — Z01.419 WELL WOMAN EXAM WITH ROUTINE GYNECOLOGICAL EXAM: Primary | ICD-10-CM

## 2023-08-30 DIAGNOSIS — Z12.31 ENCOUNTER FOR SCREENING MAMMOGRAM FOR HIGH-RISK PATIENT: ICD-10-CM

## 2023-08-30 LAB
BASOPHILS # BLD AUTO: 0.05 X10(3) UL (ref 0–0.2)
BASOPHILS NFR BLD AUTO: 1.2 %
EOSINOPHIL # BLD AUTO: 0.04 X10(3) UL (ref 0–0.7)
EOSINOPHIL NFR BLD AUTO: 0.9 %
ERYTHROCYTE [DISTWIDTH] IN BLOOD BY AUTOMATED COUNT: 12.9 %
HCT VFR BLD AUTO: 38.8 %
HGB BLD-MCNC: 13.1 G/DL
IMM GRANULOCYTES # BLD AUTO: 0.01 X10(3) UL (ref 0–1)
IMM GRANULOCYTES NFR BLD: 0.2 %
LYMPHOCYTES # BLD AUTO: 1.39 X10(3) UL (ref 1–4)
LYMPHOCYTES NFR BLD AUTO: 32.5 %
MCH RBC QN AUTO: 32.1 PG (ref 26–34)
MCHC RBC AUTO-ENTMCNC: 33.8 G/DL (ref 31–37)
MCV RBC AUTO: 95.1 FL
MONOCYTES # BLD AUTO: 0.31 X10(3) UL (ref 0.1–1)
MONOCYTES NFR BLD AUTO: 7.2 %
NEUTROPHILS # BLD AUTO: 2.48 X10 (3) UL (ref 1.5–7.7)
NEUTROPHILS # BLD AUTO: 2.48 X10(3) UL (ref 1.5–7.7)
NEUTROPHILS NFR BLD AUTO: 58 %
PLATELET # BLD AUTO: 221 10(3)UL (ref 150–450)
RBC # BLD AUTO: 4.08 X10(6)UL
WBC # BLD AUTO: 4.3 X10(3) UL (ref 4–11)

## 2023-08-30 PROCEDURE — 3078F DIAST BP <80 MM HG: CPT | Performed by: FAMILY MEDICINE

## 2023-08-30 PROCEDURE — 36415 COLL VENOUS BLD VENIPUNCTURE: CPT | Performed by: FAMILY MEDICINE

## 2023-08-30 PROCEDURE — 99396 PREV VISIT EST AGE 40-64: CPT | Performed by: FAMILY MEDICINE

## 2023-08-30 PROCEDURE — 85025 COMPLETE CBC W/AUTO DIFF WBC: CPT | Performed by: FAMILY MEDICINE

## 2023-08-30 PROCEDURE — 3074F SYST BP LT 130 MM HG: CPT | Performed by: FAMILY MEDICINE

## 2023-08-30 PROCEDURE — 3008F BODY MASS INDEX DOCD: CPT | Performed by: FAMILY MEDICINE

## 2023-09-01 LAB — HPV MRNA E6/E7: NOT DETECTED

## 2023-09-05 ENCOUNTER — PATIENT MESSAGE (OUTPATIENT)
Dept: FAMILY MEDICINE CLINIC | Facility: CLINIC | Age: 48
End: 2023-09-05

## 2023-09-05 DIAGNOSIS — K42.9 UMBILICAL HERNIA WITHOUT OBSTRUCTION AND WITHOUT GANGRENE: Primary | ICD-10-CM

## 2023-09-18 ENCOUNTER — OFFICE VISIT (OUTPATIENT)
Dept: SURGERY | Facility: CLINIC | Age: 48
End: 2023-09-18
Payer: COMMERCIAL

## 2023-09-18 VITALS — HEART RATE: 89 BPM

## 2023-09-18 DIAGNOSIS — K42.9 UMBILICAL HERNIA WITHOUT OBSTRUCTION AND WITHOUT GANGRENE: Primary | ICD-10-CM

## 2023-09-18 PROCEDURE — 99205 OFFICE O/P NEW HI 60 MIN: CPT | Performed by: SURGERY

## 2023-09-18 NOTE — H&P
New Patient Visit Note       Active Problems      No diagnosis found. Chief Complaint   Patient presents with:  Hernia: NP- referred by Dr. Leobardo Palmer  Umbilical hernia  Per pt she has had hernia for a long time, pain at times, no imaging completed       Allergies  Julieta Martinez is allergic to macrobid [nitrofurantoin monohydrate macrocrystals], nitrofuran derivatives, and amoxicillin. Past Medical / Surgical / Social / Family History    The past medical and past surgical history have been reviewed by me today. Past Medical History:   Diagnosis Date    Family history of colonic polyps, father and 2 sisters 1/18/2019    Hx of motion sickness     Visual impairment     contacts/glasses     Past Surgical History:   Procedure Laterality Date    COLONOSCOPY  05/2019    COLONOSCOPY N/A 6/14/2022    Procedure: COLONOSCOPY;  Surgeon: Brooks Feldman MD;  Location: [address] [place] [address] ENDOSCOPY       The family history and social history have been reviewed by me today. Social History    Tobacco Use      Smoking status: Never      Smokeless tobacco: Never    Alcohol use: No       Current Outpatient Medications:     acyclovir 5 % External Ointment, Apply 1 Application topically every 3 (three) hours as needed. , Disp: 15 g, Rfl: 0    valACYclovir 1 G Oral Tab, 1 tab po BID x 5 days prn first sign of a rash., Disp: 20 tablet, Rfl: 0    sulfamethoxazole-trimethoprim -160 MG Oral Tab per tablet, Take 1 tablet by mouth 2 (two) times daily. , Disp: 10 tablet, Rfl: 0    D-Mannose 350 MG Oral Cap, Take by mouth., Disp: , Rfl:     Melatonin 10 MG Oral Cap, Take by mouth as needed. , Disp: , Rfl:     Probiotic Product (PROBIOTIC DAILY OR), Take by mouth 2 (two) times daily. , Disp: , Rfl:     Multiple Vitamins-Minerals (MULTI-VITAMIN/MINERALS) Oral Tab, Take 1 tablet by mouth daily. , Disp: , Rfl:     Omega-3 Fatty Acids (FISH OIL ADULT GUMMIES OR), Take by mouth., Disp: , Rfl:     Magnesium 100 MG Oral Tab, Take by mouth., Disp: , Rfl:       Review of Systems  The Review of Systems has been reviewed by me during today. Review of Systems   Constitutional:  Negative for diaphoresis, fever and unexpected weight change. HENT:  Negative for congestion, nosebleeds, sore throat and trouble swallowing. Eyes:  Negative for pain, discharge, redness and visual disturbance. Respiratory:  Negative for cough, shortness of breath and wheezing. Cardiovascular:  Negative for chest pain and palpitations. Gastrointestinal:  Positive for abdominal pain. Negative for abdominal distention, blood in stool, constipation, diarrhea, nausea and vomiting. Genitourinary:  Negative for difficulty urinating, dysuria and frequency. Musculoskeletal:  Negative for joint swelling and neck pain. Skin:  Negative for color change and rash. Neurological:  Negative for dizziness, syncope and light-headedness. Hematological:  Negative for adenopathy. Does not bruise/bleed easily. Psychiatric/Behavioral:  Negative for confusion, hallucinations and sleep disturbance. Physical Findings   Pulse 89   LMP 06/13/2023 (Approximate)   Physical Exam  Constitutional:       Appearance: She is well-developed. HENT:      Head: Normocephalic and atraumatic. Eyes:      Pupils: Pupils are equal, round, and reactive to light. Cardiovascular:      Rate and Rhythm: Normal rate and regular rhythm. Pulmonary:      Effort: Pulmonary effort is normal.      Breath sounds: Normal breath sounds. Abdominal:      General: Bowel sounds are normal. There is no distension. Palpations: Abdomen is soft. Tenderness: There is no abdominal tenderness. Hernia: A hernia is present. Hernia is present in the umbilical area. Comments: Protuberance at the umbilicus which is consistent with a small umbilical hernia   Musculoskeletal:         General: No deformity. Normal range of motion. Skin:     General: Skin is warm and dry. Findings: No rash.    Neurological: Mental Status: She is alert and oriented to person, place, and time. History of Present Illness     54-year-old female who is here for evaluation of umbilical hernia. The patient reports that she has noted some distortion of her umbilicus for the past 13 years since her last pregnancy. The patient reports that she is quite active and exercises regularly. She states that she has noted some increase in the protuberance over the past few years. Carlee Paniagua reports that although this umbilical hernia is usually asymptomatic, she does notice some discomfort in the area when she is getting in and out of bed and exercising. On physical examination there is a small partially chronically incarcerated umbilical hernia. After discussion with the patient regarding treatment options, at this time she does wish to proceed with umbilical herniorrhaphy. Due to the small size of this defect, mesh will likely not be indicated. She had and wishes to schedule surgical repair of this umbilical hernia sometime in January or February 2024    Assessment   No diagnosis found. Plan     Intermittently symptomatic umbilical hernia which is chronically incarcerated. Patient would like to defer surgical repair until January or February 2024. Due to small size of defect, this will likely not require mesh. The risks, benefits, complications, possible outcomes and alternatives of the procedure were explained to the patient in detail. Expected postoperative pain, recuperation and postoperative course was also reviewed. All questions from the patient were answered in detail. The patient did verbalize understanding and does not have any further questions at this time. The patient does wish to proceed with the proposed procedure. No orders of the defined types were placed in this encounter. Imaging & Referrals   None    Follow Up  No follow-ups on file.     Randy Duron MD

## 2023-10-16 ENCOUNTER — HOSPITAL ENCOUNTER (OUTPATIENT)
Dept: MAMMOGRAPHY | Age: 48
Discharge: HOME | End: 2023-10-16
Attending: FAMILY MEDICINE
Payer: COMMERCIAL

## 2023-10-16 ENCOUNTER — TELEPHONE (OUTPATIENT)
Dept: FAMILY MEDICINE CLINIC | Facility: CLINIC | Age: 48
End: 2023-10-16

## 2023-10-16 DIAGNOSIS — Z12.31 ENCOUNTER FOR SCREENING MAMMOGRAM FOR HIGH-RISK PATIENT: ICD-10-CM

## 2023-10-16 PROCEDURE — 77067 SCR MAMMO BI INCL CAD: CPT | Performed by: FAMILY MEDICINE

## 2023-10-16 PROCEDURE — 77063 BREAST TOMOSYNTHESIS BI: CPT | Performed by: FAMILY MEDICINE

## 2023-10-16 NOTE — TELEPHONE ENCOUNTER
Pt instructed Dr Zeke Garcia has not yet reviewed mammogram result but mammography has already ordered additional views. Pt given # for central scheduling.

## 2023-10-25 ENCOUNTER — PATIENT MESSAGE (OUTPATIENT)
Dept: FAMILY MEDICINE CLINIC | Facility: CLINIC | Age: 48
End: 2023-10-25

## 2023-10-25 ENCOUNTER — HOSPITAL ENCOUNTER (OUTPATIENT)
Dept: MAMMOGRAPHY | Facility: HOSPITAL | Age: 48
Discharge: HOME | End: 2023-10-25
Attending: FAMILY MEDICINE

## 2023-10-25 DIAGNOSIS — R92.8 ABNORMAL MAMMOGRAM: ICD-10-CM

## 2023-10-25 PROCEDURE — 77065 DX MAMMO INCL CAD UNI: CPT | Performed by: FAMILY MEDICINE

## 2023-10-25 PROCEDURE — 77061 BREAST TOMOSYNTHESIS UNI: CPT | Performed by: FAMILY MEDICINE

## 2023-10-25 NOTE — TELEPHONE ENCOUNTER
From: Consuella Burrs Rentmeester  To: Solange Abdullahi  Sent: 10/25/2023 3:36 PM CDT  Subject: Mammogram follow-up - URGENT    Dr. Irlanda Pugh,    In 2022 I had a slight abnormal mammogram with slight calcification and they did an ultrasound. Today I went back to have more mammogram imaging done, but they never did an ultrasound and they are recommending either a biopsy or a 6 month follow-up. I would like to get a screening ultrasound soon before I make that decision. Wouldn't that be the next step and could you put an order in for an ultrasound? thanks.

## 2023-10-25 NOTE — IMAGING NOTE
This Breast Care RN assisted Dr. Bimal Martinez with recommendation for a left breast 1 site stereotactic biopsy for calcifications. Procedure reviewed and all questions answered. Emotional and educational support given. On the day of the biopsy, pt instructed to take Tylenol 1000mg PO, eat a light meal & bring or wear a sports bra. Post biopsy care also reviewed with pt to include NO lifting more than 5lbs, no exercising or housework (limit upper body movement) for 24-48 hrs post biopsy. Patient denies blood thinners, bleeding disorders, liver disease, and chemo. Pt verbalized understanding. Our breast center schedulers will be calling to schedule an appt that is convenient for pt.

## 2023-10-26 ENCOUNTER — TELEPHONE (OUTPATIENT)
Dept: GENERAL RADIOLOGY | Facility: HOSPITAL | Age: 48
End: 2023-10-26

## 2023-10-26 DIAGNOSIS — R92.8 ABNORMAL MAMMOGRAM: ICD-10-CM

## 2023-10-26 DIAGNOSIS — R92.30 DENSE BREASTS: Primary | ICD-10-CM

## 2023-10-26 DIAGNOSIS — R92.2 DENSE BREASTS: Primary | ICD-10-CM

## 2023-10-26 NOTE — TELEPHONE ENCOUNTER
1700: Spoke briefly with Gaile Reek Rentmeester-follow up to breast imaging and recommendation Wednesday, October 25. See EMR for imaging report. Introduced myself as one of the breast nurse coordinator's and informed Ms. Rentmeester of the purpose of my call. Briefly reviewed imaging results and radiologist recommendations. Ms. Chapito Lopez verbalized understanding of radiologist recommendations. Ms. Chapito Lopez shared that she plans to complete breast ultrasound prior to making a decision regarding recommended breast biopsy procedure. Ms. Chapito Lopez informed this breast care nurse that she will contact the breast center with her decision.   Breast center will follow

## 2023-10-30 ENCOUNTER — HOSPITAL ENCOUNTER (OUTPATIENT)
Dept: MAMMOGRAPHY | Facility: HOSPITAL | Age: 48
Discharge: HOME | End: 2023-10-30
Attending: FAMILY MEDICINE
Payer: COMMERCIAL

## 2023-10-30 DIAGNOSIS — R92.2 DENSE BREASTS: ICD-10-CM

## 2023-10-30 DIAGNOSIS — R92.8 ABNORMAL MAMMOGRAM: ICD-10-CM

## 2023-10-30 DIAGNOSIS — R92.30 DENSE BREASTS: ICD-10-CM

## 2023-10-30 PROCEDURE — 76641 ULTRASOUND BREAST COMPLETE: CPT | Performed by: FAMILY MEDICINE

## 2023-11-01 ENCOUNTER — TELEPHONE (OUTPATIENT)
Dept: MAMMOGRAPHY | Facility: HOSPITAL | Age: 48
End: 2023-11-01

## 2023-11-01 NOTE — TELEPHONE ENCOUNTER
Attempted to call patient post negative breast ultrasound re: update on her decision about having recommended stereotactic biopsy. Message left for patient to call back.

## 2023-11-08 ENCOUNTER — TELEPHONE (OUTPATIENT)
Dept: GENERAL RADIOLOGY | Facility: HOSPITAL | Age: 48
End: 2023-11-08

## 2023-11-10 ENCOUNTER — TELEPHONE (OUTPATIENT)
Dept: MAMMOGRAPHY | Facility: HOSPITAL | Age: 48
End: 2023-11-10

## 2023-11-10 ENCOUNTER — TELEPHONE (OUTPATIENT)
Dept: FAMILY MEDICINE CLINIC | Facility: CLINIC | Age: 48
End: 2023-11-10

## 2023-11-10 NOTE — TELEPHONE ENCOUNTER
Please call pt - biopsy recommended   If unwilling to have recommend consult with Dr. Gonzalez Shown

## 2023-11-10 NOTE — TELEPHONE ENCOUNTER
Attempted to reach patient [facility] regarding biopsy recommendation. Message left to call back.

## 2023-11-10 NOTE — TELEPHONE ENCOUNTER
Lee Kendall from mammography dept. calling. She states pt had mammogram and US and recommendation was for left breast biopsy. Radiology recommended biopsy they state pt states she was  unsure if she wanted biopsy and was to speak with Dr Luis Hilton. They state pt was to call them back if she wanted to proceed with biopsy. Lee Kendall states they have been unable to get in touch with pt. Please advise.

## 2024-05-28 NOTE — TELEPHONE ENCOUNTER
Patient is traveling and would like to have this on hand.  She said that [facility] lets her keep it on hand

## 2024-05-31 RX ORDER — SULFAMETHOXAZOLE AND TRIMETHOPRIM 800; 160 MG/1; MG/1
1 TABLET ORAL 2 TIMES DAILY
Qty: 10 TABLET | Refills: 0 | Status: SHIPPED | OUTPATIENT
Start: 2024-05-31

## 2024-05-31 NOTE — TELEPHONE ENCOUNTER
.A refill request was received for:  Requested Prescriptions     Pending Prescriptions Disp Refills    sulfamethoxazole-trimethoprim -160 MG Oral Tab per tablet 10 tablet 0     Sig: Take 1 tablet by mouth 2 (two) times daily.       Last refill date:   6/13/2023    Last office visit: 8/30/2023    Follow up due:  Future Appointments   Date Time Provider Department Center   9/5/2024  8:30 AM Cecile Hidalgo DO [facility] [address]       See message below

## 2024-05-31 NOTE — TELEPHONE ENCOUNTER
Patient calling checking on status of script refill.  It was requested on 5/25.  Please advise patient when it has be sent to the pharmacy.  She will be traveling shortly.

## 2024-09-02 NOTE — PROGRESS NOTES
HPI:   Shannon Dugan Rentmeester is a 48 year old female who presents for a complete physical exam.       Wt Readings from Last 6 Encounters:   09/05/24 162 lb (73.5 kg)   08/30/23 145 lb (65.8 kg)   08/03/23 146 lb (66.2 kg)   06/21/23 146 lb (66.2 kg)   08/29/22 134 lb (60.8 kg)   07/05/22 138 lb 12.8 oz (63 kg)     Body mass index is 28.03 kg/m².     Cholesterol, Total (mg/dL)   Date Value   08/29/2022 173   08/11/2021 194   05/03/2019 184     CHOLESTEROL, TOTAL (mg/dL)   Date Value   06/21/2023 188   07/28/2020 167     HDL Cholesterol (mg/dL)   Date Value   08/29/2022 105 (H)   08/11/2021 100 (H)   05/03/2019 100 (H)     HDL CHOLESTEROL (mg/dL)   Date Value   06/21/2023 93   07/28/2020 80     LDL Cholesterol (mg/dL)   Date Value   08/29/2022 58   08/11/2021 83   05/03/2019 68     LDL-CHOLESTEROL (mg/dL (calc))   Date Value   06/21/2023 81   07/28/2020 73     AST (U/L)   Date Value   06/21/2023 18   08/29/2022 15   08/11/2021 13 (L)   07/28/2020 15   05/03/2019 17     ALT (U/L)   Date Value   06/21/2023 12   08/29/2022 21   08/11/2021 19   07/28/2020 9   05/03/2019 22       all previous paps normal  No vaginal discharge  No bladder dysfunction  Regular menses  birth control- vasectomy   + performing self breast exams  last mammogram- 2019  + calcium and vit D supplementation  + family history of  ovarian cancer- maternal aunt   Maternal aunt and paternal aunt and cousin in her 30's - breast cancer   Maternal aunt - colon cancer - age late 60's     Current Outpatient Medications   Medication Sig Dispense Refill    acyclovir 5 % External Ointment Apply 1 Application topically every 3 (three) hours as needed. 15 g 0    valACYclovir 1 G Oral Tab 1 tab po BID x 5 days prn first sign of a rash. 20 tablet 0    D-Mannose 350 MG Oral Cap Take by mouth.      Melatonin 10 MG Oral Cap Take by mouth as needed.      Probiotic Product (PROBIOTIC DAILY OR) Take by mouth 2 (two) times daily.      Multiple Vitamins-Minerals  (MULTI-VITAMIN/MINERALS) Oral Tab Take 1 tablet by mouth daily.      Omega-3 Fatty Acids (FISH OIL ADULT GUMMIES OR) Take by mouth.      Magnesium 100 MG Oral Tab Take by mouth.        Past Medical History:    Family history of colonic polyps, father and 2 sisters    Hx of motion sickness    Visual impairment    contacts/glasses      Past Surgical History:   Procedure Laterality Date    Colonoscopy  05/2019    Colonoscopy N/A 6/14/2022    Procedure: COLONOSCOPY;  Surgeon: Nj Horne MD;  Location:  ENDOSCOPY      Family History   Problem Relation Age of Onset    Ovarian Cancer Maternal Aunt 74    Breast Cancer Paternal Aunt         mid 50's    Breast Cancer Paternal Cousin Female         30's    Alcohol and Other Disorders Associated Brother     Depression Brother     Heart Disease Father     Hypertension Father     Other (heartburn) Mother     Breast Cancer Maternal Aunt 80        In her 80's.      Social History:   Social History     Socioeconomic History    Marital status:    Tobacco Use    Smoking status: Never    Smokeless tobacco: Never   Vaping Use    Vaping status: Never Used   Substance and Sexual Activity    Alcohol use: No    Drug use: No   Other Topics Concern    Caffeine Concern No    Exercise Yes     Occ: . : 2. Children:     Exercise: three times per week.  Diet: watches calories closely     REVIEW OF SYSTEMS:   GENERAL: feels well otherwise  SKIN: denies any unusual skin lesions  EYES:denies blurred vision or double vision  HEENT: denies nasal congestion, sinus pain or ST  LUNGS: denies shortness of breath with exertion  CARDIOVASCULAR: denies chest pain on exertion  GI: denies abdominal pain,denies heartburn  : denies dysuria, vaginal discharge or itching   MUSCULOSKELETAL: denies back pain  NEURO: denies headaches  PSYCHE: denies depression or anxiety  HEMATOLOGIC: denies hx of anemia  ALL/ASTHMA: denies asthma    EXAM:   /60   Pulse 71   Resp 18   Ht 5' 3.75\" (1.619  m)   Wt 162 lb (73.5 kg)   SpO2 99%   BMI 28.03 kg/m²   Body mass index is 28.03 kg/m².   GENERAL: alert and oriented X 3, well developed, well nourished,in no apparent distress  CARDIO: RRR without murmur  LUNGS: clear to auscultation  NECK: supple,no adenopathy, + thyromegaly  HEENT: atraumatic, normocephalic,ears and throat are clear  EYES:PERRLA, EOMI, normal,conjunctiva are clear  SKIN: norashes, left upper back 4mm dark mole with irregular borders (pt sees derm regularly )  GI: good BS's,no masses, HSM or tenderness  CHEST: no chest tenderness  BREAST: no axillary LAD, no masses no nipple discharge bilaterally  : external genitalia - no inguinal LAD, no lesions.    Speculum exam- introitus is normal,scant discharge,cervix is pink.   Bimanual exam- no adnexal masses or tenderness  RECTAL:good rectal tone,no mass, brown stool, stool is OB negative  MUSCULOSKELETAL: back is not tender,FROM of the back  EXTREMITIES: no cyanosis, clubbing or edema  Right lateral calf - atrophy of anterior tibialis   NEURO: cranial nerves are intact,motor and sensory are grossly intact    ASSESSMENT AND PLAN:   Shannon Dugan Rentmeester is a 48 year old female who presents with    1. Well woman exam with routine gynecological exam      2. Annual physical exam    - Free T4, (Free Thyroxine)  - Assay, Thyroid Stim Hormone  - Lipid Panel  - CBC With Differential With Platelet  - Vitamin B12  - Comp Metabolic Panel (14)  - Hemoglobin A1C  - FSH [470] [Q]  - ESTRADIOL [4021] [Q]  - PROGESTERONE [745] [Q]  - Vitamin D [E]; Future  - Testosterone,Free And Total (Female/Child) [E]    3. Vitamin D deficiency    - Vitamin D [E]; Future    4. Encounter for screening mammogram for high-risk patient    - [facility] CHAD 2D+3D SCREENING BILAT (CPT=77067/82249); Future    5. Goiter    - US THYROID (CPT=76536); Future      Discussed diet, exercise,calcium, vitamin D, fish oil and self breast exams.   Questions answered and patient indicates  understanding of these issues and agrees to the plan.  Follow up in 1 year or sooner if needed

## 2024-09-05 ENCOUNTER — LAB ENCOUNTER (OUTPATIENT)
Dept: LAB | Age: 49
End: 2024-09-05
Attending: FAMILY MEDICINE
Payer: COMMERCIAL

## 2024-09-05 ENCOUNTER — OFFICE VISIT (OUTPATIENT)
Dept: FAMILY MEDICINE CLINIC | Facility: CLINIC | Age: 49
End: 2024-09-05
Payer: COMMERCIAL

## 2024-09-05 VITALS
DIASTOLIC BLOOD PRESSURE: 60 MMHG | RESPIRATION RATE: 18 BRPM | OXYGEN SATURATION: 99 % | HEIGHT: 63.75 IN | SYSTOLIC BLOOD PRESSURE: 102 MMHG | WEIGHT: 162 LBS | BODY MASS INDEX: 28 KG/M2 | HEART RATE: 71 BPM

## 2024-09-05 DIAGNOSIS — Z00.00 ANNUAL PHYSICAL EXAM: ICD-10-CM

## 2024-09-05 DIAGNOSIS — E55.9 VITAMIN D DEFICIENCY: ICD-10-CM

## 2024-09-05 DIAGNOSIS — Z01.419 WELL WOMAN EXAM WITH ROUTINE GYNECOLOGICAL EXAM: Primary | ICD-10-CM

## 2024-09-05 DIAGNOSIS — Z12.4 SCREENING FOR CERVICAL CANCER: ICD-10-CM

## 2024-09-05 DIAGNOSIS — E04.9 GOITER: ICD-10-CM

## 2024-09-05 DIAGNOSIS — Z12.31 ENCOUNTER FOR SCREENING MAMMOGRAM FOR HIGH-RISK PATIENT: ICD-10-CM

## 2024-09-05 LAB
ALBUMIN SERPL-MCNC: 5 G/DL (ref 3.2–4.8)
ALBUMIN/GLOB SERPL: 1.7 {RATIO} (ref 1–2)
ALP LIVER SERPL-CCNC: 80 U/L
ALT SERPL-CCNC: 16 U/L
ANION GAP SERPL CALC-SCNC: 10 MMOL/L (ref 0–18)
AST SERPL-CCNC: 19 U/L (ref ?–34)
BASOPHILS # BLD AUTO: 0.06 X10(3) UL (ref 0–0.2)
BASOPHILS NFR BLD AUTO: 1.3 %
BILIRUB SERPL-MCNC: 0.6 MG/DL (ref 0.3–1.2)
BUN BLD-MCNC: 19 MG/DL (ref 9–23)
CALCIUM BLD-MCNC: 9.9 MG/DL (ref 8.7–10.4)
CHLORIDE SERPL-SCNC: 104 MMOL/L (ref 98–112)
CHOLEST SERPL-MCNC: 213 MG/DL (ref ?–200)
CO2 SERPL-SCNC: 26 MMOL/L (ref 21–32)
CREAT BLD-MCNC: 0.95 MG/DL
EGFRCR SERPLBLD CKD-EPI 2021: 73 ML/MIN/1.73M2 (ref 60–?)
EOSINOPHIL # BLD AUTO: 0.03 X10(3) UL (ref 0–0.7)
EOSINOPHIL NFR BLD AUTO: 0.7 %
ERYTHROCYTE [DISTWIDTH] IN BLOOD BY AUTOMATED COUNT: 12.3 %
EST. AVERAGE GLUCOSE BLD GHB EST-MCNC: 111 MG/DL (ref 68–126)
ESTRADIOL SERPL-MCNC: 14.4 PG/ML
FASTING PATIENT LIPID ANSWER: YES
FASTING STATUS PATIENT QL REPORTED: YES
FSH SERPL-ACNC: 146.1 MIU/ML
GLOBULIN PLAS-MCNC: 2.9 G/DL (ref 2–3.5)
GLUCOSE BLD-MCNC: 85 MG/DL (ref 70–99)
HBA1C MFR BLD: 5.5 % (ref ?–5.7)
HCT VFR BLD AUTO: 42 %
HDLC SERPL-MCNC: 90 MG/DL (ref 40–59)
HGB BLD-MCNC: 14.3 G/DL
IMM GRANULOCYTES # BLD AUTO: 0.01 X10(3) UL (ref 0–1)
IMM GRANULOCYTES NFR BLD: 0.2 %
LDLC SERPL CALC-MCNC: 113 MG/DL (ref ?–100)
LYMPHOCYTES # BLD AUTO: 1.2 X10(3) UL (ref 1–4)
LYMPHOCYTES NFR BLD AUTO: 26.7 %
MCH RBC QN AUTO: 31.9 PG (ref 26–34)
MCHC RBC AUTO-ENTMCNC: 34 G/DL (ref 31–37)
MCV RBC AUTO: 93.8 FL
MONOCYTES # BLD AUTO: 0.32 X10(3) UL (ref 0.1–1)
MONOCYTES NFR BLD AUTO: 7.1 %
NEUTROPHILS # BLD AUTO: 2.87 X10 (3) UL (ref 1.5–7.7)
NEUTROPHILS # BLD AUTO: 2.87 X10(3) UL (ref 1.5–7.7)
NEUTROPHILS NFR BLD AUTO: 64 %
NONHDLC SERPL-MCNC: 123 MG/DL (ref ?–130)
OSMOLALITY SERPL CALC.SUM OF ELEC: 292 MOSM/KG (ref 275–295)
PLATELET # BLD AUTO: 263 10(3)UL (ref 150–450)
POTASSIUM SERPL-SCNC: 4.1 MMOL/L (ref 3.5–5.1)
PROGEST SERPL-MCNC: 0.51 NG/ML
PROT SERPL-MCNC: 7.9 G/DL (ref 5.7–8.2)
RBC # BLD AUTO: 4.48 X10(6)UL
SODIUM SERPL-SCNC: 140 MMOL/L (ref 136–145)
T4 FREE SERPL-MCNC: 1 NG/DL (ref 0.8–1.7)
TRIGL SERPL-MCNC: 56 MG/DL (ref 30–149)
TSI SER-ACNC: 3.24 MIU/ML (ref 0.55–4.78)
VIT B12 SERPL-MCNC: 820 PG/ML (ref 211–911)
VIT D+METAB SERPL-MCNC: 62 NG/ML (ref 30–100)
VLDLC SERPL CALC-MCNC: 10 MG/DL (ref 0–30)
WBC # BLD AUTO: 4.5 X10(3) UL (ref 4–11)

## 2024-09-05 PROCEDURE — 83001 ASSAY OF GONADOTROPIN (FSH): CPT | Performed by: FAMILY MEDICINE

## 2024-09-05 PROCEDURE — 80053 COMPREHEN METABOLIC PANEL: CPT | Performed by: FAMILY MEDICINE

## 2024-09-05 PROCEDURE — 80061 LIPID PANEL: CPT | Performed by: FAMILY MEDICINE

## 2024-09-05 PROCEDURE — 36415 COLL VENOUS BLD VENIPUNCTURE: CPT

## 2024-09-05 PROCEDURE — 82607 VITAMIN B-12: CPT | Performed by: FAMILY MEDICINE

## 2024-09-05 PROCEDURE — 85025 COMPLETE CBC W/AUTO DIFF WBC: CPT | Performed by: FAMILY MEDICINE

## 2024-09-05 PROCEDURE — 83036 HEMOGLOBIN GLYCOSYLATED A1C: CPT | Performed by: FAMILY MEDICINE

## 2024-09-05 PROCEDURE — 82306 VITAMIN D 25 HYDROXY: CPT

## 2024-09-05 PROCEDURE — 99396 PREV VISIT EST AGE 40-64: CPT | Performed by: FAMILY MEDICINE

## 2024-09-05 PROCEDURE — 84410 TESTOSTERONE BIOAVAILABLE: CPT | Performed by: FAMILY MEDICINE

## 2024-09-05 PROCEDURE — 84439 ASSAY OF FREE THYROXINE: CPT | Performed by: FAMILY MEDICINE

## 2024-09-05 PROCEDURE — 84144 ASSAY OF PROGESTERONE: CPT | Performed by: FAMILY MEDICINE

## 2024-09-05 PROCEDURE — 84443 ASSAY THYROID STIM HORMONE: CPT | Performed by: FAMILY MEDICINE

## 2024-09-05 PROCEDURE — 82670 ASSAY OF TOTAL ESTRADIOL: CPT | Performed by: FAMILY MEDICINE

## 2024-09-10 LAB — HPV MRNA E6/E7: NOT DETECTED

## 2024-09-11 LAB
SEX HORM BIND GLOB: 56.4 NMOL/L
TESTOST % FREE+WEAK BND: 9.6 %
TESTOST FREE+WEAK BND: 2.2 NG/DL
TESTOSTERONE TOT /MS: 23.2 NG/DL

## 2024-10-23 RX ORDER — ACYCLOVIR 50 MG/G
1 OINTMENT TOPICAL
Qty: 15 G | Refills: 0 | Status: SHIPPED | OUTPATIENT
Start: 2024-10-23

## 2024-10-23 RX ORDER — VALACYCLOVIR HYDROCHLORIDE 1 G/1
TABLET, FILM COATED ORAL
Qty: 20 TABLET | Refills: 0 | Status: SHIPPED | OUTPATIENT
Start: 2024-10-23

## 2024-10-23 NOTE — TELEPHONE ENCOUNTER
A refill request was received for:  Requested Prescriptions     Pending Prescriptions Disp Refills    acyclovir 5 % External Ointment 15 g 0     Sig: Apply 1 Application topically every 3 (three) hours as needed.     Signed Prescriptions Disp Refills    valACYclovir 1 G Oral Tab 20 tablet 0     Si tab po BID x 5 days prn first sign of a rash.     Authorizing Provider: ZENAIDA BAY     Ordering User: MARLENA RO       Last refill date:   23    Last office visit: 24 (px with sylvia)    Follow up due:  No future appointments.

## 2024-10-31 RX ORDER — VALACYCLOVIR HYDROCHLORIDE 1 G/1
TABLET, FILM COATED ORAL
Qty: 20 TABLET | Refills: 0 | OUTPATIENT
Start: 2024-10-31

## 2024-10-31 NOTE — TELEPHONE ENCOUNTER
Pt needs this as back up, since she finished first round.  She always has med on hand for future outbreak.

## 2024-11-06 RX ORDER — VALACYCLOVIR HYDROCHLORIDE 1 G/1
TABLET, FILM COATED ORAL
Qty: 20 TABLET | Refills: 0 | Status: SHIPPED | OUTPATIENT
Start: 2024-11-06

## 2025-01-07 ENCOUNTER — HOSPITAL ENCOUNTER (OUTPATIENT)
Dept: ULTRASOUND IMAGING | Age: 50
Discharge: HOME | End: 2025-01-07
Attending: FAMILY MEDICINE
Payer: COMMERCIAL

## 2025-01-07 ENCOUNTER — HOSPITAL ENCOUNTER (OUTPATIENT)
Dept: MAMMOGRAPHY | Age: 50
Discharge: HOME | End: 2025-01-07
Attending: FAMILY MEDICINE
Payer: COMMERCIAL

## 2025-01-07 DIAGNOSIS — E04.9 GOITER: ICD-10-CM

## 2025-01-07 DIAGNOSIS — Z12.31 ENCOUNTER FOR SCREENING MAMMOGRAM FOR HIGH-RISK PATIENT: ICD-10-CM

## 2025-01-07 PROCEDURE — 76536 US EXAM OF HEAD AND NECK: CPT | Performed by: FAMILY MEDICINE

## 2025-01-07 PROCEDURE — 77067 SCR MAMMO BI INCL CAD: CPT | Performed by: FAMILY MEDICINE

## 2025-01-07 PROCEDURE — 77063 BREAST TOMOSYNTHESIS BI: CPT | Performed by: FAMILY MEDICINE

## 2025-01-22 ENCOUNTER — PATIENT MESSAGE (OUTPATIENT)
Dept: FAMILY MEDICINE CLINIC | Facility: CLINIC | Age: 50
End: 2025-01-22

## 2025-06-02 RX ORDER — VALACYCLOVIR HYDROCHLORIDE 1 G/1
TABLET, FILM COATED ORAL
Qty: 20 TABLET | Refills: 0 | Status: SHIPPED | OUTPATIENT
Start: 2025-06-02

## 2025-07-30 ENCOUNTER — OFFICE VISIT (OUTPATIENT)
Dept: FAMILY MEDICINE CLINIC | Facility: CLINIC | Age: 50
End: 2025-07-30
Payer: COMMERCIAL

## 2025-07-30 VITALS
HEART RATE: 66 BPM | SYSTOLIC BLOOD PRESSURE: 112 MMHG | WEIGHT: 175 LBS | RESPIRATION RATE: 14 BRPM | BODY MASS INDEX: 30.25 KG/M2 | DIASTOLIC BLOOD PRESSURE: 70 MMHG | OXYGEN SATURATION: 98 % | HEIGHT: 63.75 IN

## 2025-07-30 DIAGNOSIS — N95.0 POSTMENOPAUSAL BLEEDING: ICD-10-CM

## 2025-07-30 DIAGNOSIS — N95.2 ATROPHIC VAGINITIS: ICD-10-CM

## 2025-07-30 DIAGNOSIS — N89.8 VAGINAL IRRITATION: Primary | ICD-10-CM

## 2025-07-30 PROCEDURE — 99213 OFFICE O/P EST LOW 20 MIN: CPT | Performed by: PHYSICIAN ASSISTANT

## 2025-07-30 RX ORDER — ESTRADIOL 0.1 MG/G
CREAM VAGINAL
Qty: 42.5 G | Refills: 2 | Status: SHIPPED | OUTPATIENT
Start: 2025-07-30

## 2025-07-31 ENCOUNTER — RESULTS FOLLOW-UP (OUTPATIENT)
Dept: FAMILY MEDICINE CLINIC | Facility: CLINIC | Age: 50
End: 2025-07-31

## 2025-07-31 LAB
CANDIDA GLABRATA: NOT DETECTED
CANDIDA SPECIES: DETECTED
CHLAMYDIA TRACHOMATIS$RNA, TMA: NOT DETECTED
NEISSERIA GONORRHOEAE$RNA, TMA: NOT DETECTED
SURESWAB(R) ADV BACTERIAL VAGINOSIS (BV), TMA: NEGATIVE
TRICHOMONAS VAGINALIS (TV): NOT DETECTED

## 2025-07-31 RX ORDER — FLUCONAZOLE 150 MG/1
TABLET ORAL
Qty: 2 TABLET | Refills: 0 | Status: SHIPPED | OUTPATIENT
Start: 2025-07-31

## 2025-07-31 RX ORDER — FLUCONAZOLE 150 MG/1
150 TABLET ORAL ONCE
Qty: 1 TABLET | Refills: 0 | Status: CANCELLED | OUTPATIENT
Start: 2025-07-31 | End: 2025-07-31

## 2025-08-19 RX ORDER — VALACYCLOVIR HYDROCHLORIDE 1 G/1
TABLET, FILM COATED ORAL
Qty: 20 TABLET | Refills: 0 | Status: SHIPPED | OUTPATIENT
Start: 2025-08-19

## (undated) DEVICE — 3M™ RED DOT™ MONITORING ELECTRODE WITH FOAM TAPE AND STICKY GEL, 50/BAG, 20/CASE, 72/PLT 2570: Brand: RED DOT™

## (undated) DEVICE — FILTERLINE NASAL ADULT O2/CO2

## (undated) DEVICE — ENDOSCOPY PACK - LOWER: Brand: MEDLINE INDUSTRIES, INC.

## (undated) DEVICE — 1200CC GUARDIAN II: Brand: GUARDIAN

## (undated) DEVICE — Device: Brand: DEFENDO AIR/WATER/SUCTION AND BIOPSY VALVE

## (undated) NOTE — LETTER
19    Patient: Lg Fine  : 1975 Visit date: 2019    Dear  Dr. Mario Leyva, DO,    Thank you for referring Lg Fnie to my practice. Please find my assessment and plan below.                 Assessment   Alternating cons

## (undated) NOTE — LETTER
22    Patient: Cheko Paniagua  : 1975 Visit date: 2022    Dear  Zechariah Nunez, DO    Thank you for referring Cheko Paniagua to my practice. Please find my assessment and plan below. Assessment   Adenomatous polyp  (primary encounter diagnosis)  Family history of colonic polyps, father   Family history of colon cancer, 2 paternal aunts age 63's      Plan   I am seeing this patient in consultation regarding her adenomatous polyp removed from the cecum, as well as a significant family history of colon cancer in her 2 paternal aunts. This patient underwent colonoscopy in May 2019. She was found to have an adenomatous polyp of the cecum. She has no current bright red blood per rectum, no melena. She has no mucus in the stool or narrowing of the stool. She has no diarrhea or constipation. She has no abdominal pain or distention. She has not suffered weight loss as a symptom. She has never been diagnosed with Crohn's disease, ulcerative colitis, or irritable bowel syndrome. She has no first-degree relatives with colon cancer. She has no first or second-degree relatives with cancer of the uterus. The patient's father also had colon polyps. The patient has had no abdominal operations. She is known to have a longstanding umbilical hernia. She is not on any blood thinners. She has no history of heart attack, stroke, heart murmur, heart valve replacement, or cardiac arrhythmia. Clinical exam reveals her abdomen to be soft, nondistended, nontender, good bowel sounds. No guarding or rebound. No masses. No ascites. She has a 1.5 cm umbilical hernia sac to the right of the umbilicus, there are reducible contents through a very tiny fascial defect. There are no inguinal hernias present. Bowel sounds abnormal activity normal pitch. With her current family history and her personal history of colon polyps, she will require colonoscopy every 3 years.     We are currently scheduling procedure through our office.       Sincerely,       Leeanna Spring MD   CC: No Recipients